# Patient Record
Sex: FEMALE | Race: WHITE | NOT HISPANIC OR LATINO | Employment: FULL TIME | URBAN - NONMETROPOLITAN AREA
[De-identification: names, ages, dates, MRNs, and addresses within clinical notes are randomized per-mention and may not be internally consistent; named-entity substitution may affect disease eponyms.]

---

## 2023-10-23 ENCOUNTER — OFFICE VISIT (OUTPATIENT)
Dept: MEDICAL GROUP | Facility: CLINIC | Age: 48
End: 2023-10-23
Payer: COMMERCIAL

## 2023-10-23 VITALS
RESPIRATION RATE: 20 BRPM | HEIGHT: 64 IN | DIASTOLIC BLOOD PRESSURE: 70 MMHG | HEART RATE: 104 BPM | WEIGHT: 209 LBS | OXYGEN SATURATION: 97 % | BODY MASS INDEX: 35.68 KG/M2 | SYSTOLIC BLOOD PRESSURE: 122 MMHG | TEMPERATURE: 99.1 F

## 2023-10-23 DIAGNOSIS — D50.8 OTHER IRON DEFICIENCY ANEMIA: ICD-10-CM

## 2023-10-23 DIAGNOSIS — R73.03 PREDIABETES: ICD-10-CM

## 2023-10-23 DIAGNOSIS — R10.13 DYSPEPSIA: ICD-10-CM

## 2023-10-23 DIAGNOSIS — J45.41 MODERATE PERSISTENT ASTHMA WITH ACUTE EXACERBATION IN ADULT: ICD-10-CM

## 2023-10-23 DIAGNOSIS — E66.09 CLASS 2 OBESITY DUE TO EXCESS CALORIES WITHOUT SERIOUS COMORBIDITY WITH BODY MASS INDEX (BMI) OF 35.0 TO 35.9 IN ADULT: ICD-10-CM

## 2023-10-23 DIAGNOSIS — C54.1 ENDOMETRIAL CANCER (HCC): ICD-10-CM

## 2023-10-23 DIAGNOSIS — Z83.2 FAMILY HISTORY OF CLOTTING DISORDER: ICD-10-CM

## 2023-10-23 DIAGNOSIS — H35.3290 EXUDATIVE AGE-RELATED MACULAR DEGENERATION, UNSPECIFIED LATERALITY, UNSPECIFIED STAGE (HCC): ICD-10-CM

## 2023-10-23 PROBLEM — E66.9 CLASS 2 OBESITY WITH BODY MASS INDEX (BMI) OF 35.0 TO 35.9 IN ADULT: Status: ACTIVE | Noted: 2023-10-23

## 2023-10-23 PROBLEM — E66.812 CLASS 2 OBESITY WITH BODY MASS INDEX (BMI) OF 35.0 TO 35.9 IN ADULT: Status: ACTIVE | Noted: 2023-10-23

## 2023-10-23 PROCEDURE — 3078F DIAST BP <80 MM HG: CPT | Performed by: PHYSICIAN ASSISTANT

## 2023-10-23 PROCEDURE — 3074F SYST BP LT 130 MM HG: CPT | Performed by: PHYSICIAN ASSISTANT

## 2023-10-23 PROCEDURE — 99204 OFFICE O/P NEW MOD 45 MIN: CPT | Performed by: PHYSICIAN ASSISTANT

## 2023-10-23 RX ORDER — CETIRIZINE HYDROCHLORIDE 10 MG/1
10 TABLET, CHEWABLE ORAL NIGHTLY
COMMUNITY
End: 2023-12-21

## 2023-10-23 RX ORDER — BUDESONIDE AND FORMOTEROL FUMARATE DIHYDRATE 80; 4.5 UG/1; UG/1
1 AEROSOL RESPIRATORY (INHALATION) 2 TIMES DAILY
Qty: 1 EACH | Refills: 5 | Status: SHIPPED | OUTPATIENT
Start: 2023-10-23

## 2023-10-23 RX ORDER — ALBUTEROL SULFATE 90 UG/1
2 AEROSOL, METERED RESPIRATORY (INHALATION) EVERY 4 HOURS PRN
Qty: 1 EACH | Refills: 2 | Status: SHIPPED | OUTPATIENT
Start: 2023-10-23

## 2023-10-23 RX ORDER — SENNOSIDES A AND B 8.6 MG/1
8.6 TABLET, FILM COATED ORAL DAILY
Qty: 30 TABLET | Refills: 0 | Status: SHIPPED | OUTPATIENT
Start: 2023-10-23

## 2023-10-23 ASSESSMENT — PATIENT HEALTH QUESTIONNAIRE - PHQ9: CLINICAL INTERPRETATION OF PHQ2 SCORE: 0

## 2023-10-23 NOTE — PROGRESS NOTES
"cc:  establish care    Subjective:     Carole Garcia is a 48 y.o. female presenting for establish care      Patient presents to the office for [facility] care.  Patient indicates that she has been diagnosed with endometrial cancer.  She also had lab work drawn on 9-.  It does show that she is anemic, that she has an A1c of 5.7, and that her triglycerides are elevated at 180.  Thyroid levels are normal.  Patient is also concerned about her thyroid.  She states that multiple family members have a history of hashimotos and states that she is concerned about her neck size.  Patient also indicates seeing a retina specialist for wet macular degeneration.      Patient states that she had an episode of shortness of breath.  She states that this occurred 2 years ago.  She is concerned that she had shortness of breath.  She states that at the time there was some stress going on.      Patient states that she is using an otc inhaler--primatine.  She states that she can use an inhaler several times a day.   She does report being on Advair in the past.      Patient states that she has been taking an \"antacid\" for several years.  Sugar and salt make it worse.    Review of systems:  See above.   Denies any symptoms unless previously indicated.        Current Outpatient Medications:     cetirizine (ZYRTEC) 10 MG chewable tablet, Chew 10 mg every evening., Disp: , Rfl:     budesonide-formoterol (SYMBICORT) 80-4.5 MCG/ACT Aerosol, Inhale 1 Puff 2 times a day., Disp: 1 Each, Rfl: 5    albuterol (PROVENTIL HFA) 108 (90 Base) MCG/ACT Aero Soln inhalation aerosol, Inhale 2 Puffs every four hours as needed for Shortness of Breath., Disp: 1 Each, Rfl: 2    sennosides (SENOKOT) 8.6 MG Tab, Take 1 Tablet by mouth every day., Disp: 30 Tablet, Rfl: 0    ibuprofen (MOTRIN) 800 MG TABS, Take 1 Tab by mouth every 8 hours as needed. AS NEEDED FOR MILD PAIN, Disp: 90 Tab, Rfl: 0    ferrous sulfate 325 (65 FE) MG tablet, Take 1 Tab by mouth " "every day., Disp: 30 Tab, Rfl: 3    Pseudoephedrine HCl (SUDAFED 12 HOUR PO), Take  by mouth., Disp: , Rfl:     ranitidine (ZANTAC) 150 MG TABS, Take 150 mg by mouth 2 times a day., Disp: , Rfl:     fenofibrate (TRICOR) 145 MG TABS, Take 1 Tab by mouth every day. (Patient not taking: Reported on 10/23/2023), Disp: 30 Tab, Rfl: 0    IRON, FERROUS GLUCONATE, PO, Take  by mouth. (Patient not taking: Reported on 10/23/2023), Disp: , Rfl:     Allergies, past medical history, past surgical history, family history, social history reviewed and updated    Objective:     Vitals: /70 (BP Location: Left arm, Patient Position: Sitting, BP Cuff Size: Adult)   Pulse (!) 104   Temp 37.3 °C (99.1 °F) (Temporal)   Resp 20   Ht 1.626 m (5' 4\")   Wt 94.8 kg (208 lb 15.9 oz)   SpO2 97%   BMI 35.87 kg/m²   General: Alert, pleasant, NAD  EYES:   PERRL, EOMI, no icterus or pallor.  Conjunctivae and lids normal.   HENT:  Normocephalic.  External ears normal. Neck supple.     Heart: Regular rate and rhythm.  S1 and S2 normal.  No murmurs appreciated.  Respiratory: Normal respiratory effort.  Clear to auscultation bilaterally.  Abdomen: obese  Skin: Warm, dry, no rashes.  Musculoskeletal: Gait is normal.  Moves all extremities well.    Extremities: Normal range of motion all extremities.   Neurological: No tremors, sensation grossly intact,  gait is normal, CN2-12 intact.  Psych:  Affect/mood is normal, judgement is good, memory is intact, grooming is appropriate.    Assessment/Plan:     Carole was seen today for establish care.    Diagnoses and all orders for this visit:    Endometrial cancer (HCC)    Patient has an appointment with hematology oncology.  She will also follow-up with gynecology.    Prediabetes  Class 2 obesity due to excess calories without serious comorbidity with body mass index (BMI) of 35.0 to 35.9 in adult  Other iron deficiency anemia  -     CBC WITH DIFFERENTIAL; Future  -     IRON/TOTAL IRON BIND; " Future  -     VITAMIN B12; Future  -     FOLATE; Future  -     FERRITIN; Future  -     sennosides (SENOKOT) 8.6 MG Tab; Take 1 Tablet by mouth every day.    We will obtain some further labs including iron levels to evaluate further.  We can recheck A1c in approximately 6 months.  Follow-up in 5 weeks.    Exudative age-related macular degeneration, unspecified laterality, unspecified stage (HCC)      Continue to follow-up with ophthalmology.    Moderate persistent asthma with acute exacerbation in adult  -     budesonide-formoterol (SYMBICORT) 80-4.5 MCG/ACT Aerosol; Inhale 1 Puff 2 times a day.  -     albuterol (PROVENTIL HFA) 108 (90 Base) MCG/ACT Aero Soln inhalation aerosol; Inhale 2 Puffs every four hours as needed for Shortness of Breath.    We will start patient on Symbicort 80/4.51 puff twice a day and refill her albuterol.  Goal is that we will help patient decrease her albuterol use and have asthma better controlled.    Dyspepsia  -     H. PYLORI BREATH TEST  Family history of clotting disorder  -     FACTOR V LEIDEN MUTATION; Future    Further testing ordered to evaluate further.  For dyspepsia, will hold off on amlodipine until we can evaluate H. pylori.  Stop famotidine a week before breath test.        Return in about 4 weeks (around 11/20/2023).    Please note that this dictation was created using voice recognition software. I have made every reasonable attempt to correct obvious errors, but expect that there are errors of grammar and possible content that I did not discover before finalizing note.

## 2023-11-28 LAB — UREA BREATH TEST QL: NEGATIVE

## 2023-11-29 ENCOUNTER — OFFICE VISIT (OUTPATIENT)
Dept: MEDICAL GROUP | Facility: CLINIC | Age: 48
End: 2023-11-29
Payer: COMMERCIAL

## 2023-11-29 VITALS
HEART RATE: 104 BPM | HEIGHT: 64 IN | DIASTOLIC BLOOD PRESSURE: 64 MMHG | WEIGHT: 208.6 LBS | RESPIRATION RATE: 20 BRPM | SYSTOLIC BLOOD PRESSURE: 118 MMHG | BODY MASS INDEX: 35.61 KG/M2 | OXYGEN SATURATION: 100 % | TEMPERATURE: 98.8 F

## 2023-11-29 DIAGNOSIS — E78.5 DYSLIPIDEMIA: ICD-10-CM

## 2023-11-29 DIAGNOSIS — Z13.21 ENCOUNTER FOR VITAMIN DEFICIENCY SCREENING: ICD-10-CM

## 2023-11-29 DIAGNOSIS — D50.8 OTHER IRON DEFICIENCY ANEMIA: ICD-10-CM

## 2023-11-29 DIAGNOSIS — R73.03 PREDIABETES: ICD-10-CM

## 2023-11-29 DIAGNOSIS — R20.0 BILATERAL HAND NUMBNESS: ICD-10-CM

## 2023-11-29 DIAGNOSIS — R10.13 DYSPEPSIA: ICD-10-CM

## 2023-11-29 DIAGNOSIS — E55.9 VITAMIN D DEFICIENCY: ICD-10-CM

## 2023-11-29 PROCEDURE — 99214 OFFICE O/P EST MOD 30 MIN: CPT | Performed by: PHYSICIAN ASSISTANT

## 2023-11-29 PROCEDURE — 3078F DIAST BP <80 MM HG: CPT | Performed by: PHYSICIAN ASSISTANT

## 2023-11-29 PROCEDURE — 3074F SYST BP LT 130 MM HG: CPT | Performed by: PHYSICIAN ASSISTANT

## 2023-11-29 RX ORDER — OMEPRAZOLE 40 MG/1
40 CAPSULE, DELAYED RELEASE ORAL DAILY
Qty: 90 CAPSULE | Refills: 1 | Status: SHIPPED | OUTPATIENT
Start: 2023-11-29

## 2023-11-29 NOTE — PROGRESS NOTES
cc: Follow-up    Subjective:     Carole Garcia is a 48 y.o. female presenting for follow-up    Patient presents to the office for follow-up.  Patient presents to the office to follow-up with anemia and prediabetes along with dyspepsia and a family history of clotting disorder.  At this time we do not have her most recent lab results.Patient states that she had labs drawn 2 days ago.  She states that she is doing well with the inhaler.      Patient states that she was diagnosed with carpal tunnel in 2014.  She states that she has been using braces and doing exercises.  She has continued with splints.   She would like to wait on the nerve conduction study.      Patient states that her acid reflux symptoms are improved with medication.  However, she is still having symptoms.  She stopped taking the ranitidine to take the breath test.      Patient states that she has been having achy legs.  She would like to start a vitamin D supplement.  She does have vitamin D deficiency.    Review of systems:  See above.   Denies any symptoms unless previously indicated.        Current Outpatient Medications:     omeprazole (PRILOSEC) 40 MG delayed-release capsule, Take 1 Capsule by mouth every day., Disp: 90 Capsule, Rfl: 1    cetirizine (ZYRTEC) 10 MG chewable tablet, Chew 10 mg every evening., Disp: , Rfl:     budesonide-formoterol (SYMBICORT) 80-4.5 MCG/ACT Aerosol, Inhale 1 Puff 2 times a day., Disp: 1 Each, Rfl: 5    albuterol (PROVENTIL HFA) 108 (90 Base) MCG/ACT Aero Soln inhalation aerosol, Inhale 2 Puffs every four hours as needed for Shortness of Breath., Disp: 1 Each, Rfl: 2    sennosides (SENOKOT) 8.6 MG Tab, Take 1 Tablet by mouth every day., Disp: 30 Tablet, Rfl: 0    ibuprofen (MOTRIN) 800 MG TABS, Take 1 Tab by mouth every 8 hours as needed. AS NEEDED FOR MILD PAIN, Disp: 90 Tab, Rfl: 0    Pseudoephedrine HCl (SUDAFED 12 HOUR PO), Take  by mouth., Disp: , Rfl:     fenofibrate (TRICOR) 145 MG TABS, Take 1 Tab by mouth  "every day. (Patient not taking: Reported on 10/23/2023), Disp: 30 Tab, Rfl: 0    ferrous sulfate 325 (65 FE) MG tablet, Take 1 Tab by mouth every day. (Patient not taking: Reported on 11/29/2023), Disp: 30 Tab, Rfl: 3    ranitidine (ZANTAC) 150 MG TABS, Take 150 mg by mouth 2 times a day. (Patient not taking: Reported on 11/29/2023), Disp: , Rfl:     Allergies, past medical history, past surgical history, family history, social history reviewed and updated    Objective:     Vitals: /64 (BP Location: Left arm, Patient Position: Sitting, BP Cuff Size: Adult)   Pulse (!) 104   Temp 37.1 °C (98.8 °F) (Temporal)   Resp 20   Ht 1.613 m (5' 3.5\")   Wt 94.6 kg (208 lb 9.6 oz)   SpO2 100%   BMI 36.37 kg/m²   General: Alert, pleasant, NAD  EYES:   PERRL, EOMI, no icterus or pallor.  Conjunctivae and lids normal.   HENT:  Normocephalic.  External ears normal.  Neck supple.    Respiratory: Normal respiratory effort.    Abdomen: obese  Skin: Warm, dry, no rashes.  Musculoskeletal: Gait is normal.  Moves all extremities well.    Extremities: Normal range of motion all extremities.   Neurological: No tremors, sensation grossly intact, CN2-12 intact.  Psych:  Affect/mood is normal, judgement is good, memory is intact, grooming is appropriate.    Assessment/Plan:     Carole was seen today for lab results and wrist pain.    Diagnoses and all orders for this visit:    Prediabetes  -     Lipid Profile; Future  -     Comp Metabolic Panel; Future  -     HEMOGLOBIN A1C; Future  Other iron deficiency anemia  -     CBC WITH DIFFERENTIAL; Future  -     IRON/TOTAL IRON BIND; Future  -     FERRITIN; Future  Dyslipidemia  -     Lipid Profile; Future  -     Comp Metabolic Panel; Future    Repeat labs in approximately 5 months.  Follow-up with test results when received.    Bilateral hand numbness    Patient is not wanting to proceed with nerve conduction study at this time but she does want to note that her symptoms are starting to " worsen.  Advised patient that she can contact our office and we can place the order further nerve conduction study when she is ready.  At this time she will continue with wrist splints and exercises.    Dyspepsia  -     omeprazole (PRILOSEC) 40 MG delayed-release capsule; Take 1 Capsule by mouth every day.    Willing to try omeprazole.  Side effects of medication discussed.  Will let us know any changes in symptoms.    Encounter for vitamin deficiency screening  -     VITAMIN D,25 HYDROXY (DEFICIENCY); Future  Vitamin D deficiency  -     VITAMIN D,25 HYDROXY (DEFICIENCY); Future    Labs ordered to evaluate further.  In the meantime patient can take between 2000 to 5000 units daily.        No follow-ups on file.    Please note that this dictation was created using voice recognition software. I have made every reasonable attempt to correct obvious errors, but expect that there are errors of grammar and possible content that I did not discover before finalizing note.

## 2023-11-30 LAB
BASOPHILS # BLD AUTO: 0.1 X10E3/UL (ref 0–0.2)
BASOPHILS NFR BLD AUTO: 1 %
EOSINOPHIL # BLD AUTO: 0.1 X10E3/UL (ref 0–0.4)
EOSINOPHIL NFR BLD AUTO: 2 %
ERYTHROCYTE [DISTWIDTH] IN BLOOD BY AUTOMATED COUNT: 23.7 % (ref 11.7–15.4)
F5 P.R506Q BLD/T QL: NORMAL
FERRITIN SERPL-MCNC: 283 NG/ML (ref 15–150)
FOLATE SERPL-MCNC: 6.1 NG/ML
HCT VFR BLD AUTO: 38.6 % (ref 34–46.6)
HGB BLD-MCNC: 12.1 G/DL (ref 11.1–15.9)
IMM GRANULOCYTES # BLD AUTO: 0 X10E3/UL (ref 0–0.1)
IMM GRANULOCYTES NFR BLD AUTO: 0 %
IMMATURE CELLS  115398: ABNORMAL
IRON SATN MFR SERPL: 19 % (ref 15–55)
IRON SERPL-MCNC: 54 UG/DL (ref 27–159)
LYMPHOCYTES # BLD AUTO: 1.5 X10E3/UL (ref 0.7–3.1)
LYMPHOCYTES NFR BLD AUTO: 20 %
MCH RBC QN AUTO: 27 PG (ref 26.6–33)
MCHC RBC AUTO-ENTMCNC: 31.3 G/DL (ref 31.5–35.7)
MCV RBC AUTO: 86 FL (ref 79–97)
MONOCYTES # BLD AUTO: 0.7 X10E3/UL (ref 0.1–0.9)
MONOCYTES NFR BLD AUTO: 9 %
MORPHOLOGY BLD-IMP: ABNORMAL
NEUTROPHILS # BLD AUTO: 4.9 X10E3/UL (ref 1.4–7)
NEUTROPHILS NFR BLD AUTO: 68 %
NRBC BLD AUTO-RTO: ABNORMAL %
PLATELET # BLD AUTO: 236 X10E3/UL (ref 150–450)
RBC # BLD AUTO: 4.48 X10E6/UL (ref 3.77–5.28)
TIBC SERPL-MCNC: 288 UG/DL (ref 250–450)
UIBC SERPL-MCNC: 234 UG/DL (ref 131–425)
VIT B12 SERPL-MCNC: 732 PG/ML (ref 232–1245)
WBC # BLD AUTO: 7.3 X10E3/UL (ref 3.4–10.8)

## 2023-12-15 ENCOUNTER — APPOINTMENT (OUTPATIENT)
Dept: ADMISSIONS | Facility: MEDICAL CENTER | Age: 48
End: 2023-12-15
Attending: SPECIALIST
Payer: COMMERCIAL

## 2023-12-21 ENCOUNTER — PRE-ADMISSION TESTING (OUTPATIENT)
Dept: ADMISSIONS | Facility: MEDICAL CENTER | Age: 48
End: 2023-12-21
Attending: SPECIALIST
Payer: COMMERCIAL

## 2023-12-21 RX ORDER — VITAMIN B COMPLEX
2000 TABLET ORAL DAILY
COMMUNITY

## 2023-12-26 ENCOUNTER — PRE-ADMISSION TESTING (OUTPATIENT)
Dept: ADMISSIONS | Facility: MEDICAL CENTER | Age: 48
End: 2023-12-26
Attending: SPECIALIST
Payer: COMMERCIAL

## 2023-12-26 ENCOUNTER — HOSPITAL ENCOUNTER (OUTPATIENT)
Dept: RADIOLOGY | Facility: MEDICAL CENTER | Age: 48
End: 2023-12-26
Attending: SPECIALIST | Admitting: SPECIALIST
Payer: COMMERCIAL

## 2023-12-26 DIAGNOSIS — Z01.811 PRE-OPERATIVE RESPIRATORY EXAMINATION: ICD-10-CM

## 2023-12-26 DIAGNOSIS — Z01.812 PRE-OPERATIVE LABORATORY EXAMINATION: ICD-10-CM

## 2023-12-26 DIAGNOSIS — Z01.810 PRE-OPERATIVE CARDIOVASCULAR EXAMINATION: ICD-10-CM

## 2023-12-26 LAB
ABO GROUP BLD: NORMAL
ALBUMIN SERPL BCP-MCNC: 4.3 G/DL (ref 3.2–4.9)
ALBUMIN/GLOB SERPL: 1.7 G/DL
ALP SERPL-CCNC: 81 U/L (ref 30–99)
ALT SERPL-CCNC: 31 U/L (ref 2–50)
ANION GAP SERPL CALC-SCNC: 11 MMOL/L (ref 7–16)
APTT PPP: 31.3 SEC (ref 24.7–36)
AST SERPL-CCNC: 19 U/L (ref 12–45)
B-HCG SERPL-ACNC: <1 MIU/ML (ref 0–5)
BASOPHILS # BLD AUTO: 1 % (ref 0–1.8)
BASOPHILS # BLD: 0.08 K/UL (ref 0–0.12)
BILIRUB SERPL-MCNC: 0.4 MG/DL (ref 0.1–1.5)
BLD GP AB SCN SERPL QL: NORMAL
BUN SERPL-MCNC: 7 MG/DL (ref 8–22)
CALCIUM ALBUM COR SERPL-MCNC: 8.8 MG/DL (ref 8.5–10.5)
CALCIUM SERPL-MCNC: 9 MG/DL (ref 8.5–10.5)
CHLORIDE SERPL-SCNC: 103 MMOL/L (ref 96–112)
CO2 SERPL-SCNC: 22 MMOL/L (ref 20–33)
COMMENT 1642: NORMAL
CREAT SERPL-MCNC: 0.76 MG/DL (ref 0.5–1.4)
EKG IMPRESSION: NORMAL
EOSINOPHIL # BLD AUTO: 0.17 K/UL (ref 0–0.51)
EOSINOPHIL NFR BLD: 2.1 % (ref 0–6.9)
ERYTHROCYTE [DISTWIDTH] IN BLOOD BY AUTOMATED COUNT: 65.2 FL (ref 35.9–50)
GFR SERPLBLD CREATININE-BSD FMLA CKD-EPI: 98 ML/MIN/1.73 M 2
GLOBULIN SER CALC-MCNC: 2.5 G/DL (ref 1.9–3.5)
GLUCOSE SERPL-MCNC: 99 MG/DL (ref 65–99)
HCT VFR BLD AUTO: 40.9 % (ref 37–47)
HGB BLD-MCNC: 13.2 G/DL (ref 12–16)
IMM GRANULOCYTES # BLD AUTO: 0.02 K/UL (ref 0–0.11)
IMM GRANULOCYTES NFR BLD AUTO: 0.2 % (ref 0–0.9)
INR PPP: 1.07 (ref 0.87–1.13)
LYMPHOCYTES # BLD AUTO: 1.72 K/UL (ref 1–4.8)
LYMPHOCYTES NFR BLD: 21.1 % (ref 22–41)
MCH RBC QN AUTO: 28.6 PG (ref 27–33)
MCHC RBC AUTO-ENTMCNC: 32.3 G/DL (ref 32.2–35.5)
MCV RBC AUTO: 88.5 FL (ref 81.4–97.8)
MONOCYTES # BLD AUTO: 0.88 K/UL (ref 0–0.85)
MONOCYTES NFR BLD AUTO: 10.8 % (ref 0–13.4)
MORPHOLOGY BLD-IMP: NORMAL
NEUTROPHILS # BLD AUTO: 5.27 K/UL (ref 1.82–7.42)
NEUTROPHILS NFR BLD: 64.8 % (ref 44–72)
NRBC # BLD AUTO: 0 K/UL
NRBC BLD-RTO: 0 /100 WBC (ref 0–0.2)
OVALOCYTES BLD QL SMEAR: NORMAL
PLATELET # BLD AUTO: 275 K/UL (ref 164–446)
PLATELET BLD QL SMEAR: NORMAL
PMV BLD AUTO: 10.6 FL (ref 9–12.9)
POIKILOCYTOSIS BLD QL SMEAR: NORMAL
POTASSIUM SERPL-SCNC: 4 MMOL/L (ref 3.6–5.5)
PROT SERPL-MCNC: 6.8 G/DL (ref 6–8.2)
PROTHROMBIN TIME: 14 SEC (ref 12–14.6)
RBC # BLD AUTO: 4.62 M/UL (ref 4.2–5.4)
RBC BLD AUTO: PRESENT
RH BLD: NORMAL
SODIUM SERPL-SCNC: 136 MMOL/L (ref 135–145)
WBC # BLD AUTO: 8.1 K/UL (ref 4.8–10.8)

## 2023-12-26 PROCEDURE — 36415 COLL VENOUS BLD VENIPUNCTURE: CPT

## 2023-12-26 PROCEDURE — 71045 X-RAY EXAM CHEST 1 VIEW: CPT

## 2023-12-26 PROCEDURE — 93005 ELECTROCARDIOGRAM TRACING: CPT

## 2023-12-26 PROCEDURE — 85730 THROMBOPLASTIN TIME PARTIAL: CPT

## 2023-12-26 PROCEDURE — 86850 RBC ANTIBODY SCREEN: CPT

## 2023-12-26 PROCEDURE — 85610 PROTHROMBIN TIME: CPT

## 2023-12-26 PROCEDURE — 84702 CHORIONIC GONADOTROPIN TEST: CPT

## 2023-12-26 PROCEDURE — 93010 ELECTROCARDIOGRAM REPORT: CPT | Performed by: INTERNAL MEDICINE

## 2023-12-26 PROCEDURE — 86901 BLOOD TYPING SEROLOGIC RH(D): CPT

## 2023-12-26 PROCEDURE — 85025 COMPLETE CBC W/AUTO DIFF WBC: CPT

## 2023-12-26 PROCEDURE — 80053 COMPREHEN METABOLIC PANEL: CPT

## 2023-12-26 PROCEDURE — 86900 BLOOD TYPING SEROLOGIC ABO: CPT

## 2023-12-28 ENCOUNTER — HOSPITAL ENCOUNTER (OUTPATIENT)
Facility: MEDICAL CENTER | Age: 48
End: 2023-12-28
Attending: SPECIALIST | Admitting: SPECIALIST
Payer: COMMERCIAL

## 2023-12-28 ENCOUNTER — ANESTHESIA EVENT (OUTPATIENT)
Dept: SURGERY | Facility: MEDICAL CENTER | Age: 48
End: 2023-12-28
Payer: COMMERCIAL

## 2023-12-28 ENCOUNTER — ANESTHESIA (OUTPATIENT)
Dept: SURGERY | Facility: MEDICAL CENTER | Age: 48
End: 2023-12-28
Payer: COMMERCIAL

## 2023-12-28 VITALS
HEART RATE: 89 BPM | BODY MASS INDEX: 37.46 KG/M2 | SYSTOLIC BLOOD PRESSURE: 125 MMHG | TEMPERATURE: 98.4 F | DIASTOLIC BLOOD PRESSURE: 79 MMHG | RESPIRATION RATE: 16 BRPM | WEIGHT: 211.42 LBS | HEIGHT: 63 IN | OXYGEN SATURATION: 93 %

## 2023-12-28 LAB — ABO + RH BLD: NORMAL

## 2023-12-28 PROCEDURE — 700101 HCHG RX REV CODE 250: Performed by: ANESTHESIOLOGY

## 2023-12-28 PROCEDURE — 160025 RECOVERY II MINUTES (STATS): Performed by: SPECIALIST

## 2023-12-28 PROCEDURE — 160002 HCHG RECOVERY MINUTES (STAT): Performed by: SPECIALIST

## 2023-12-28 PROCEDURE — 88112 CYTOPATH CELL ENHANCE TECH: CPT

## 2023-12-28 PROCEDURE — 700101 HCHG RX REV CODE 250: Performed by: SPECIALIST

## 2023-12-28 PROCEDURE — 160042 HCHG SURGERY MINUTES - EA ADDL 1 MIN LEVEL 5: Performed by: SPECIALIST

## 2023-12-28 PROCEDURE — 700105 HCHG RX REV CODE 258: Performed by: SPECIALIST

## 2023-12-28 PROCEDURE — 700102 HCHG RX REV CODE 250 W/ 637 OVERRIDE(OP): Performed by: ANESTHESIOLOGY

## 2023-12-28 PROCEDURE — 88342 IMHCHEM/IMCYTCHM 1ST ANTB: CPT

## 2023-12-28 PROCEDURE — 36415 COLL VENOUS BLD VENIPUNCTURE: CPT

## 2023-12-28 PROCEDURE — 160009 HCHG ANES TIME/MIN: Performed by: SPECIALIST

## 2023-12-28 PROCEDURE — 160048 HCHG OR STATISTICAL LEVEL 1-5: Performed by: SPECIALIST

## 2023-12-28 PROCEDURE — 88309 TISSUE EXAM BY PATHOLOGIST: CPT

## 2023-12-28 PROCEDURE — 502714 HCHG ROBOTIC SURGERY SERVICES: Performed by: SPECIALIST

## 2023-12-28 PROCEDURE — 88360 TUMOR IMMUNOHISTOCHEM/MANUAL: CPT

## 2023-12-28 PROCEDURE — 160035 HCHG PACU - 1ST 60 MINS PHASE I: Performed by: SPECIALIST

## 2023-12-28 PROCEDURE — 700111 HCHG RX REV CODE 636 W/ 250 OVERRIDE (IP): Performed by: ANESTHESIOLOGY

## 2023-12-28 PROCEDURE — 160036 HCHG PACU - EA ADDL 30 MINS PHASE I: Performed by: SPECIALIST

## 2023-12-28 PROCEDURE — 700111 HCHG RX REV CODE 636 W/ 250 OVERRIDE (IP): Mod: JZ | Performed by: ANESTHESIOLOGY

## 2023-12-28 PROCEDURE — 88307 TISSUE EXAM BY PATHOLOGIST: CPT

## 2023-12-28 PROCEDURE — 88305 TISSUE EXAM BY PATHOLOGIST: CPT

## 2023-12-28 PROCEDURE — 88341 IMHCHEM/IMCYTCHM EA ADD ANTB: CPT | Mod: 91

## 2023-12-28 PROCEDURE — 160031 HCHG SURGERY MINUTES - 1ST 30 MINS LEVEL 5: Performed by: SPECIALIST

## 2023-12-28 PROCEDURE — 160046 HCHG PACU - 1ST 60 MINS PHASE II: Performed by: SPECIALIST

## 2023-12-28 PROCEDURE — A9270 NON-COVERED ITEM OR SERVICE: HCPCS | Performed by: ANESTHESIOLOGY

## 2023-12-28 PROCEDURE — 700104 HCHG RX REV CODE 254: Performed by: SPECIALIST

## 2023-12-28 RX ORDER — OXYCODONE HCL 5 MG/5 ML
5 SOLUTION, ORAL ORAL
Status: COMPLETED | OUTPATIENT
Start: 2023-12-28 | End: 2023-12-28

## 2023-12-28 RX ORDER — HYDROMORPHONE HYDROCHLORIDE 1 MG/ML
0.4 INJECTION, SOLUTION INTRAMUSCULAR; INTRAVENOUS; SUBCUTANEOUS
Status: DISCONTINUED | OUTPATIENT
Start: 2023-12-28 | End: 2023-12-28 | Stop reason: HOSPADM

## 2023-12-28 RX ORDER — ONDANSETRON 2 MG/ML
INJECTION INTRAMUSCULAR; INTRAVENOUS PRN
Status: DISCONTINUED | OUTPATIENT
Start: 2023-12-28 | End: 2023-12-28 | Stop reason: SURG

## 2023-12-28 RX ORDER — IBUPROFEN 200 MG
400 TABLET ORAL 2 TIMES DAILY PRN
COMMUNITY

## 2023-12-28 RX ORDER — EPHEDRINE SULFATE 50 MG/ML
5 INJECTION, SOLUTION INTRAVENOUS
Status: DISCONTINUED | OUTPATIENT
Start: 2023-12-28 | End: 2023-12-28 | Stop reason: HOSPADM

## 2023-12-28 RX ORDER — GABAPENTIN 300 MG/1
300 CAPSULE ORAL ONCE
Status: COMPLETED | OUTPATIENT
Start: 2023-12-28 | End: 2023-12-28

## 2023-12-28 RX ORDER — MIDAZOLAM HYDROCHLORIDE 1 MG/ML
1 INJECTION INTRAMUSCULAR; INTRAVENOUS
Status: DISCONTINUED | OUTPATIENT
Start: 2023-12-28 | End: 2023-12-28 | Stop reason: HOSPADM

## 2023-12-28 RX ORDER — BUPIVACAINE HYDROCHLORIDE AND EPINEPHRINE 2.5; 5 MG/ML; UG/ML
INJECTION, SOLUTION EPIDURAL; INFILTRATION; INTRACAUDAL; PERINEURAL
Status: DISCONTINUED | OUTPATIENT
Start: 2023-12-28 | End: 2023-12-28 | Stop reason: HOSPADM

## 2023-12-28 RX ORDER — METOPROLOL TARTRATE 1 MG/ML
1 INJECTION, SOLUTION INTRAVENOUS
Status: DISCONTINUED | OUTPATIENT
Start: 2023-12-28 | End: 2023-12-28 | Stop reason: HOSPADM

## 2023-12-28 RX ORDER — ACETAMINOPHEN 500 MG
1000 TABLET ORAL
COMMUNITY

## 2023-12-28 RX ORDER — HYDROMORPHONE HYDROCHLORIDE 2 MG/ML
INJECTION, SOLUTION INTRAMUSCULAR; INTRAVENOUS; SUBCUTANEOUS PRN
Status: DISCONTINUED | OUTPATIENT
Start: 2023-12-28 | End: 2023-12-28 | Stop reason: SURG

## 2023-12-28 RX ORDER — HYDRALAZINE HYDROCHLORIDE 20 MG/ML
5 INJECTION INTRAMUSCULAR; INTRAVENOUS
Status: DISCONTINUED | OUTPATIENT
Start: 2023-12-28 | End: 2023-12-28 | Stop reason: HOSPADM

## 2023-12-28 RX ORDER — CELECOXIB 200 MG/1
400 CAPSULE ORAL ONCE
Status: COMPLETED | OUTPATIENT
Start: 2023-12-28 | End: 2023-12-28

## 2023-12-28 RX ORDER — SODIUM CHLORIDE, SODIUM LACTATE, POTASSIUM CHLORIDE, CALCIUM CHLORIDE 600; 310; 30; 20 MG/100ML; MG/100ML; MG/100ML; MG/100ML
INJECTION, SOLUTION INTRAVENOUS
Status: DISCONTINUED | OUTPATIENT
Start: 2023-12-28 | End: 2023-12-28 | Stop reason: HOSPADM

## 2023-12-28 RX ORDER — SCOLOPAMINE TRANSDERMAL SYSTEM 1 MG/1
1 PATCH, EXTENDED RELEASE TRANSDERMAL
Status: DISCONTINUED | OUTPATIENT
Start: 2023-12-28 | End: 2023-12-28 | Stop reason: HOSPADM

## 2023-12-28 RX ORDER — MEPERIDINE HYDROCHLORIDE 25 MG/ML
12.5 INJECTION INTRAMUSCULAR; INTRAVENOUS; SUBCUTANEOUS
Status: DISCONTINUED | OUTPATIENT
Start: 2023-12-28 | End: 2023-12-28 | Stop reason: HOSPADM

## 2023-12-28 RX ORDER — SODIUM CHLORIDE, SODIUM LACTATE, POTASSIUM CHLORIDE, CALCIUM CHLORIDE 600; 310; 30; 20 MG/100ML; MG/100ML; MG/100ML; MG/100ML
INJECTION, SOLUTION INTRAVENOUS CONTINUOUS
Status: ACTIVE | OUTPATIENT
Start: 2023-12-28 | End: 2023-12-28

## 2023-12-28 RX ORDER — ROCURONIUM BROMIDE 10 MG/ML
INJECTION, SOLUTION INTRAVENOUS PRN
Status: DISCONTINUED | OUTPATIENT
Start: 2023-12-28 | End: 2023-12-28 | Stop reason: SURG

## 2023-12-28 RX ORDER — ACETAMINOPHEN 500 MG
1000 TABLET ORAL ONCE
Status: COMPLETED | OUTPATIENT
Start: 2023-12-28 | End: 2023-12-28

## 2023-12-28 RX ORDER — HYDROMORPHONE HYDROCHLORIDE 1 MG/ML
0.1 INJECTION, SOLUTION INTRAMUSCULAR; INTRAVENOUS; SUBCUTANEOUS
Status: DISCONTINUED | OUTPATIENT
Start: 2023-12-28 | End: 2023-12-28 | Stop reason: HOSPADM

## 2023-12-28 RX ORDER — ONDANSETRON 2 MG/ML
4 INJECTION INTRAMUSCULAR; INTRAVENOUS
Status: COMPLETED | OUTPATIENT
Start: 2023-12-28 | End: 2023-12-28

## 2023-12-28 RX ORDER — CEFOTETAN DISODIUM 2 G/20ML
INJECTION, POWDER, FOR SOLUTION INTRAMUSCULAR; INTRAVENOUS PRN
Status: DISCONTINUED | OUTPATIENT
Start: 2023-12-28 | End: 2023-12-28 | Stop reason: SURG

## 2023-12-28 RX ORDER — INDOCYANINE GREEN AND WATER 25 MG
KIT INJECTION
Status: DISCONTINUED | OUTPATIENT
Start: 2023-12-28 | End: 2023-12-28 | Stop reason: HOSPADM

## 2023-12-28 RX ORDER — HYDROMORPHONE HYDROCHLORIDE 1 MG/ML
0.2 INJECTION, SOLUTION INTRAMUSCULAR; INTRAVENOUS; SUBCUTANEOUS
Status: DISCONTINUED | OUTPATIENT
Start: 2023-12-28 | End: 2023-12-28 | Stop reason: HOSPADM

## 2023-12-28 RX ORDER — SODIUM CHLORIDE, SODIUM LACTATE, POTASSIUM CHLORIDE, CALCIUM CHLORIDE 600; 310; 30; 20 MG/100ML; MG/100ML; MG/100ML; MG/100ML
INJECTION, SOLUTION INTRAVENOUS CONTINUOUS
Status: DISCONTINUED | OUTPATIENT
Start: 2023-12-28 | End: 2023-12-28 | Stop reason: HOSPADM

## 2023-12-28 RX ORDER — DEXAMETHASONE SODIUM PHOSPHATE 4 MG/ML
INJECTION, SOLUTION INTRA-ARTICULAR; INTRALESIONAL; INTRAMUSCULAR; INTRAVENOUS; SOFT TISSUE PRN
Status: DISCONTINUED | OUTPATIENT
Start: 2023-12-28 | End: 2023-12-28 | Stop reason: SURG

## 2023-12-28 RX ORDER — OXYCODONE HCL 5 MG/5 ML
10 SOLUTION, ORAL ORAL
Status: COMPLETED | OUTPATIENT
Start: 2023-12-28 | End: 2023-12-28

## 2023-12-28 RX ORDER — DIPHENHYDRAMINE HYDROCHLORIDE 50 MG/ML
12.5 INJECTION INTRAMUSCULAR; INTRAVENOUS
Status: DISCONTINUED | OUTPATIENT
Start: 2023-12-28 | End: 2023-12-28 | Stop reason: HOSPADM

## 2023-12-28 RX ORDER — LABETALOL HYDROCHLORIDE 5 MG/ML
5 INJECTION, SOLUTION INTRAVENOUS
Status: DISCONTINUED | OUTPATIENT
Start: 2023-12-28 | End: 2023-12-28 | Stop reason: HOSPADM

## 2023-12-28 RX ORDER — MIDAZOLAM HYDROCHLORIDE 1 MG/ML
INJECTION INTRAMUSCULAR; INTRAVENOUS PRN
Status: DISCONTINUED | OUTPATIENT
Start: 2023-12-28 | End: 2023-12-28 | Stop reason: SURG

## 2023-12-28 RX ORDER — LIDOCAINE HYDROCHLORIDE 20 MG/ML
INJECTION, SOLUTION EPIDURAL; INFILTRATION; INTRACAUDAL; PERINEURAL PRN
Status: DISCONTINUED | OUTPATIENT
Start: 2023-12-28 | End: 2023-12-28 | Stop reason: SURG

## 2023-12-28 RX ORDER — HALOPERIDOL 5 MG/ML
1 INJECTION INTRAMUSCULAR
Status: DISCONTINUED | OUTPATIENT
Start: 2023-12-28 | End: 2023-12-28 | Stop reason: HOSPADM

## 2023-12-28 RX ORDER — OXYCODONE HCL 10 MG/1
10 TABLET, FILM COATED, EXTENDED RELEASE ORAL ONCE
Status: COMPLETED | OUTPATIENT
Start: 2023-12-28 | End: 2023-12-28

## 2023-12-28 RX ADMIN — OXYCODONE HYDROCHLORIDE 10 MG: 10 TABLET, FILM COATED, EXTENDED RELEASE ORAL at 12:06

## 2023-12-28 RX ADMIN — CELECOXIB 400 MG: 200 CAPSULE ORAL at 12:04

## 2023-12-28 RX ADMIN — FENTANYL CITRATE 100 MCG: 50 INJECTION, SOLUTION INTRAMUSCULAR; INTRAVENOUS at 15:52

## 2023-12-28 RX ADMIN — ROCURONIUM BROMIDE 20 MG: 50 INJECTION, SOLUTION INTRAVENOUS at 15:05

## 2023-12-28 RX ADMIN — HYDROMORPHONE HYDROCHLORIDE 0.5 MG: 2 INJECTION INTRAMUSCULAR; INTRAVENOUS; SUBCUTANEOUS at 16:11

## 2023-12-28 RX ADMIN — HALOPERIDOL LACTATE 1 MG: 5 INJECTION, SOLUTION INTRAMUSCULAR at 17:37

## 2023-12-28 RX ADMIN — MIDAZOLAM HYDROCHLORIDE 2 MG: 1 INJECTION, SOLUTION INTRAMUSCULAR; INTRAVENOUS at 14:19

## 2023-12-28 RX ADMIN — FENTANYL CITRATE 100 MCG: 50 INJECTION, SOLUTION INTRAMUSCULAR; INTRAVENOUS at 14:27

## 2023-12-28 RX ADMIN — SODIUM CHLORIDE, POTASSIUM CHLORIDE, SODIUM LACTATE AND CALCIUM CHLORIDE: 600; 310; 30; 20 INJECTION, SOLUTION INTRAVENOUS at 15:41

## 2023-12-28 RX ADMIN — PROPOFOL 200 MG: 10 INJECTION, EMULSION INTRAVENOUS at 14:27

## 2023-12-28 RX ADMIN — SCOPOLAMINE 1 PATCH: 1.5 PATCH, EXTENDED RELEASE TRANSDERMAL at 12:04

## 2023-12-28 RX ADMIN — FENTANYL CITRATE 50 MCG: 50 INJECTION, SOLUTION INTRAMUSCULAR; INTRAVENOUS at 15:14

## 2023-12-28 RX ADMIN — SUGAMMADEX 200 MG: 100 INJECTION, SOLUTION INTRAVENOUS at 15:59

## 2023-12-28 RX ADMIN — SODIUM CHLORIDE, POTASSIUM CHLORIDE, SODIUM LACTATE AND CALCIUM CHLORIDE: 600; 310; 30; 20 INJECTION, SOLUTION INTRAVENOUS at 10:33

## 2023-12-28 RX ADMIN — DEXAMETHASONE SODIUM PHOSPHATE 8 MG: 4 INJECTION INTRA-ARTICULAR; INTRALESIONAL; INTRAMUSCULAR; INTRAVENOUS; SOFT TISSUE at 14:27

## 2023-12-28 RX ADMIN — ROCURONIUM BROMIDE 60 MG: 50 INJECTION, SOLUTION INTRAVENOUS at 14:27

## 2023-12-28 RX ADMIN — ACETAMINOPHEN 1000 MG: 500 TABLET ORAL at 12:04

## 2023-12-28 RX ADMIN — ONDANSETRON 4 MG: 2 INJECTION INTRAMUSCULAR; INTRAVENOUS at 16:31

## 2023-12-28 RX ADMIN — OXYCODONE HYDROCHLORIDE 10 MG: 5 SOLUTION ORAL at 16:45

## 2023-12-28 RX ADMIN — CEFOTETAN DISODIUM 2 G: 2 INJECTION, POWDER, FOR SOLUTION INTRAMUSCULAR; INTRAVENOUS at 14:27

## 2023-12-28 RX ADMIN — ONDANSETRON 4 MG: 2 INJECTION INTRAMUSCULAR; INTRAVENOUS at 15:51

## 2023-12-28 RX ADMIN — LIDOCAINE HYDROCHLORIDE 100 MG: 20 INJECTION, SOLUTION EPIDURAL; INFILTRATION; INTRACAUDAL at 14:27

## 2023-12-28 RX ADMIN — FENTANYL CITRATE 50 MCG: 50 INJECTION, SOLUTION INTRAMUSCULAR; INTRAVENOUS at 17:19

## 2023-12-28 RX ADMIN — GABAPENTIN 300 MG: 300 CAPSULE ORAL at 12:05

## 2023-12-28 ASSESSMENT — PAIN DESCRIPTION - PAIN TYPE
TYPE: SURGICAL PAIN

## 2023-12-28 ASSESSMENT — PAIN SCALES - GENERAL: PAIN_LEVEL: 5

## 2023-12-28 ASSESSMENT — FIBROSIS 4 INDEX: FIB4 SCORE: 0.57

## 2023-12-28 NOTE — ANESTHESIA PREPROCEDURE EVALUATION
Case: [id number] Date/Time: 12/28/23 1215    Procedures:       ROBOTIC HYSTERECTOMY, BILATERAL SALPINGECTOMY OOPHORECTOMY, SENTINEL LYMPH NODE DISSECTION      LYMPHADENECTOMY, ROBOT-ASSISTED, USING DA MALU XI    Pre-op diagnosis: ENDOMETRIAL CANCER    Location: [facility] / SURGERY [facility]    Surgeons: Jesus Rodriguez M.D.            Relevant Problems   PULMONARY   (positive) Asthma in adult       Physical Exam    Airway   Mallampati: II  TM distance: >3 FB  Neck ROM: full       Cardiovascular - normal exam  Rhythm: regular  Rate: normal  (-) murmur     Dental - normal exam  (+) upper dentures           Pulmonary - normal exam  Breath sounds clear to auscultation     Abdominal    Neurological - normal exam                   Anesthesia Plan    ASA 2       Plan - general       Airway plan will be ETT          Induction: intravenous    Postoperative Plan: Postoperative administration of opioids is intended.    Pertinent diagnostic labs and testing reviewed    Informed Consent:    Anesthetic plan and risks discussed with patient.    Use of blood products discussed with: patient whom consented to blood products.

## 2023-12-28 NOTE — ANESTHESIA PROCEDURE NOTES
Airway    Date/Time: 12/28/2023 2:29 PM    Performed by: Wilberto Mg M.D.  Authorized by: Wilberto Mg M.D.    Location:  OR  Urgency:  Elective  Indications for Airway Management:  Anesthesia      Spontaneous Ventilation: absent    Sedation Level:  Deep  Preoxygenated: Yes    Patient Position:  Sniffing  Mask Difficulty Assessment:  1 - vent by mask  Final Airway Type:  Endotracheal airway  Final Endotracheal Airway:  ETT  Cuffed: Yes    Technique Used for Successful ETT Placement:  Direct laryngoscopy  Devices/Methods Used in Placement:  Anterior pressure/BURP    Insertion Site:  Oral  Blade Type:  Romelia  Laryngoscope Blade/Videolaryngoscope Blade Size:  3  ETT Size (mm):  7.0  Measured from:  Teeth  ETT to Teeth (cm):  22  Placement Verified by: auscultation and capnometry    Cormack-Lehane Classification:  Grade IIb - view of arytenoids or posterior of glottis only  Number of Attempts at Approach:  1

## 2023-12-28 NOTE — PROGRESS NOTES
Medication history reviewed with PT at bedside    [facility] is complete per denies    Allergies reviewed.     Patient denies any outpatient antibiotics in the last 30 days.     Patient is not taking anticoagulants.    PT gets vitamin infusions every 2 weeks from [facility] [place] ([phone]) Last infusion was on 12/22/2023    Preferred pharmacy for this visit - [facility] in [place] ([phone])

## 2023-12-29 LAB — PATHOLOGY CONSULT NOTE: NORMAL

## 2023-12-29 NOTE — OP REPORT
PreOp Diagnosis: Grade 1 endometrial cancer        PostOp Diagnosis: Same as above        Procedure(s):  ROBOTIC HYSTERECTOMY, BILATERAL SALPINGO-OOPHRECTOMY - Wound Class: Clean Contaminated  IDENTIFICATION, LYMPH NODE, SENTINEL, INTRAOPERATIVE, USING DYE INJECTION - Wound Class: Clean Contaminated  BIOPSY, LYMPH NODE, SENTINEL - Wound Class: Clean Contaminated     Surgeon(s):  Jesus Rodriguez M.D.     Anesthesiologist/Type of Anesthesia:  Anesthesiologist: Wilberto Mg M.D./General     Surgical Staff:  Circulator: Mireille Simmons  Scrub Person: Coby Lopez; Estela Jaramillo  First Assist: Ryan Medina P.A.-C.     Specimens removed if any:  ID Type Source Tests Collected by Time Destination   A : LEFT EXTERNAL ILIAC SENTINEL LYMPH NODE Tissue Lymph Node PATHOLOGY SPECIMEN Jesus Rodriguez M.D. 12/28/2023  3:10 PM     B : RIGHT EXTERNAL ILIAC SENTINEL LYMPH NODE Tissue Lymph Node PATHOLOGY SPECIMEN Jesus Rodriguez M.D. 12/28/2023  3:10 PM     C : UTERUS, CERVIX, BILATERAL FALLOPIAN TUBES AND OVARIES Tissue Uterus PATHOLOGY SPECIMEN Jesus Rodriguez M.D. 12/28/2023  3:38 PM     D : PELVIC WASHINGS FOR CYTOLOGY Other Other PATHOLOGY SPECIMEN Jesus Rodriguez M.D. 12/28/2023  3:46 PM           Estimated Blood Loss: 50 cc     Findings: No evidence of ascites.  Normal right and left diaphragm.  Liver capsule smooth.  Stomach for grossly normal.  Abdominal peritoneal surfaces were unremarkable.  Omentum.  Grossly normal.     In the pelvis uterus is approximately 10 to 12 weeks in size.  There was no evidence of tumor invaded into the serosa.  Clinically consistent with a fibroid.  The adnexal structures were unremarkable.  There is no seeding along the bladder pelvic and cul-de-sac peritoneum.     [employer] lymph node mapping revealed a symmetrical mapping with sentinel lymph nodes to be noted in the external iliac lymph nodes in the medial aspect of the external iliac artery and vein.        Complications: None     Indication: Ms.  Jose is a pleasant 48 year old  female whose LMP was 23. She has a past medical history significant for diabetes, HTN, obesity, thyroid disease, endometriosis, and seizure disorder and a past surgical history significant for an exploratory laparoscopy/biopsy, D&C due to a miscarriage, and cholecystectomy. She presented to you Dr. Bush on 23 complaining of AUB for over a year. She reported large clots, bleeding between periods, and a longer menses. She underwent a pelvic US on 23 that showed a fibroid that measured 3.31 cm. Uterus measured 10.38 x 5.22 x 5.88 cm. Left ovary measured 2.57 x 2.10 x 1.49 cm. Right ovary measured 2.84 x 2.21 x 1.98 cm. She presented to you again on 10/5/23 for a followup. She underwent an EMB on 10/5/23 which showed endometrial adenocarcinoma with extensive squamous differentiation, FIGO histologic Grade 1. She underwent a follow up ECC which showed tiny fragments of endometrial adenocarcinoma with focal squamous differentiation.     Patient was recommended to undergo robotic hysterectomy with bilateral salpingo-oophorectomy with sentinel lymph node mapping.  Risk benefits and rationale procedures were reviewed with the patient in detail patient's understanding of these risk wished to proceed with surgery as planned.    Procedure:  After achieving adequate general anesthesia, patient was prepped and draped and positioned in modified dorsal lithotomy position. Antibiotics were delivered. Surgical timeout was called. We then proceeded with our intended procedure.     Heavy weighted speculum was placed in the posterior vagina. Cervix was brought to view. The anterior lip of the cervix was grasp with single tooth tenaculum. 2 cc of Indocyanide green was injected into the cervical stroma to a depth about 2-3 mm at 3 and 9 o'clock position. Following completion of this I then proceeded on with the robotic portion of the procedure.    A 1 cm umbilical incision was made.   A Veress needle was inserted without difficulty. Abdominal pressure was noted to be < 5mm Hg.  Pneumoperitoneum was achieved to the abdominal pressure of 15 mmHg.  A 8 mm trocar was inserted without difficulty.  After completion of this, a laparoscope was placed through this port and exploratory laparoscopy revealed the above findings.  Two 8 mm ports were placed in the right upper quadrant 8 cm apart while one 8 mm port was placed in the left upper quadrant 8 cm apart.  After completion of this, the patient was placed in steep Trendelenburg position.  The robotic system was then docked and after docking the robotic system, the instrumentation was inserted under direct laparoscopic visualization to insure that there was no injury to the abdominal contents.  Once this was completed, the robotic camera was then docked.  We then proceeded with our da Josh portion of the procedure.    The posterior leaf of the broad ligament was incised.  The pararectal spaces were developed.  The ureter was identified in the medial leaf of the peritoneum. Underfire fly the sentinel lymph nodes were identified. The sentinel lymph nodes were then skeletonized, isolated and resected. The uterus was then elevated ventrally exposing the cul de sac. Posterior colpotomy incision was then made between the uterosacral ligaments and the sentinel nodes were removed vaginally and send for frozen section. The avascular space of Graves was then created.  The right and left pelvic ureters were identified.  The ovarian vessels were then subsequently isolated and bipolar cautery was used to cauterize the right and left IP and subsequently divided.  The medial leaf of the peritoneum was then incised down to the level of the uterosacral ligament.  Ureters were dissected laterally while developing the [place]ashi space .  The round ligament was then divided followed by the anterior broad leaf ligament. The Prograsp from the  robotic “3rd arm” was then  used to grasp the triple pedicle and counter traction was provided while the cardinal ligament was exposed. Uterine artery and vein were then subsequently skeletonized.  Using the bipolar cautery, the uterine artery and vein were then cauterized juxtaposition to the fundus of the uterus.  The remainder of the lower uterine segment was likewise divided. After completion of this, the uterus was then retracted ventrally and the uterosacral ligaments were then independently divided.  Great care was then taken to clearly not injure the ureter.  After the uterosacral ligaments were then divided, the anterior branches of the uterine vessels were then subsequently skeletonized and cauterized with bipolar cautery and divided.  The bladder peritoneum was then subsequently taken down.  Once we were assured that the bladder was dissected off the paracervical fascia, the posterior colpotomy posterior colpotomy was made.  The vagina was circumferentially incised to complete the hysterectomy and BSO.  The specimen was removed through the vaginal vault without difficulty.  The vaginal cuff was then closed with O [place] PDS suture in 2 layer running fashion.      Pelvic washings were then obtained. The pelvis was then copiously irrigated with water and we established hemostasis. We then accounted for sponges and needles. Once this was confirmed correct, robotic instrumentation with withdraw and pneumoperitoneum was allowed to escape through the 8 mm ports. After appropriate expelling all the intrabdominal pneumoperitoneum, we irrigated the subcutaneous tissue with water. The skin was reapproximated with 3-0 monocryl suture. The incision was then injected with 0.25% Marcaine for local anesthetic effect. Dressings were appropriately placed.     Patient tolerated the procedure well without any difficulties and was extubated and transferred to the PACU in stable excellent condition.       Jesus Rodriguez M.D.

## 2023-12-29 NOTE — OR SURGEON
Immediate Post OP Note    PreOp Diagnosis: Grade 1 endometrial cancer      PostOp Diagnosis: Same as above      Procedure(s):  ROBOTIC HYSTERECTOMY, BILATERAL SALPINGO-OOPHRECTOMY - Wound Class: Clean Contaminated  IDENTIFICATION, LYMPH NODE, SENTINEL, INTRAOPERATIVE, USING DYE INJECTION - Wound Class: Clean Contaminated  BIOPSY, LYMPH NODE, SENTINEL - Wound Class: Clean Contaminated    Surgeon(s):  Jesus Rodriguez M.D.    Anesthesiologist/Type of Anesthesia:  Anesthesiologist: Wilberto Mg M.D./General    Surgical Staff:  Circulator: Mireille Simmons  Scrub Person: Coby Lopez; Estela Jaramillo  First Assist: Ryan Medina P.A.-C.    Specimens removed if any:  ID Type Source Tests Collected by Time Destination   A : LEFT EXTERNAL ILIAC SENTINEL LYMPH NODE Tissue Lymph Node PATHOLOGY SPECIMEN Jesus Rodriguez M.D. 12/28/2023  3:10 PM    B : RIGHT EXTERNAL ILIAC SENTINEL LYMPH NODE Tissue Lymph Node PATHOLOGY SPECIMEN Jesus Rodriguez M.D. 12/28/2023  3:10 PM    C : UTERUS, CERVIX, BILATERAL FALLOPIAN TUBES AND OVARIES Tissue Uterus PATHOLOGY SPECIMEN Jesus Rodriguez M.D. 12/28/2023  3:38 PM    D : PELVIC WASHINGS FOR CYTOLOGY Other Other PATHOLOGY SPECIMEN Jesus Rodriguez M.D. 12/28/2023  3:46 PM        Estimated Blood Loss: 50 cc    Findings: No evidence of ascites.  Normal right and left diaphragm.  Liver capsule smooth.  Stomach for grossly normal.  Abdominal peritoneal surfaces were unremarkable.  Omentum.  Grossly normal.    In the pelvis uterus is approximately 10 to 12 weeks in size.  There was no evidence of tumor invaded into the serosa.  Clinically consistent with a fibroid.  The adnexal structures were unremarkable.  There is no seeding along the bladder pelvic and cul-de-sac peritoneum.    [place] lymph node mapping revealed a symmetrical mapping with sentinel lymph nodes to be noted in the external iliac lymph nodes in the medial aspect of the external iliac artery and vein.      Complications:  None        12/28/2023 6:32 PM Jesus Rodriguez M.D.

## 2023-12-29 NOTE — ANESTHESIA POSTPROCEDURE EVALUATION
Patient: Carole Garcia    Procedure Summary       Date: 12/28/23 Room / Location: [place] [address] / SURGERY [facility]    Anesthesia Start: 1420 Anesthesia Stop: 1616    Procedures:       ROBOTIC HYSTERECTOMY, BILATERAL SALPINGO-OOPHRECTOMY (Pelvis)      IDENTIFICATION, LYMPH NODE, SENTINEL, INTRAOPERATIVE, USING DYE INJECTION (Vagina )      BIOPSY, LYMPH NODE, SENTINEL (Bilateral: Pelvis) Diagnosis: (ENDOMETRIAL CANCER)    Surgeons: Jesus Rodriguez M.D. Responsible Provider: Wilberto Mg M.D.    Anesthesia Type: general ASA Status: 2            Final Anesthesia Type: general  Last vitals  BP   Blood Pressure: 139/74    Temp   36.9 °C (98.4 °F)    Pulse   79   Resp   16    SpO2   89 %      Anesthesia Post Evaluation    Patient location during evaluation: PACU  Patient participation: complete - patient participated  Level of consciousness: awake and alert  Pain score: 5    Airway patency: patent  Anesthetic complications: no  Cardiovascular status: hemodynamically stable  Respiratory status: acceptable  Hydration status: euvolemic    PONV: none          No notable events documented.

## 2023-12-29 NOTE — ANESTHESIA TIME REPORT
Anesthesia Start and Stop Event Times       Date Time Event    12/28/2023 1401 Ready for Procedure     1420 Anesthesia Start     1616 Anesthesia Stop          Responsible Staff  12/28/23      Name Role Begin End    Wilberto Mg M.D. Anesth 1420 1616          Overtime Reason:  no overtime (within assigned shift)    Comments:

## 2023-12-29 NOTE — DISCHARGE INSTRUCTIONS
HOME CARE INSTRUCTIONS    ACTIVITY: Rest and take it easy for the first 24 hours.  A responsible adult is recommended to remain with you during that time.  It is normal to feel sleepy.  We encourage you to not do anything that requires balance, judgment or coordination.    FOR 24 HOURS DO NOT:  Drive, operate machinery or run household appliances.  Drink beer or alcoholic beverages.  Make important decisions or sign legal documents.    SPECIAL INSTRUCTIONS:   Follow up with surgeon in office as instructed or sooner if needed. Call to make follow up appointment if you don't already have one. Also call for questions or concerns.    Call the office [phone] if you develop any fevers, chills, nausea/vomiting, heavy vaginal bleeding or redness, tenderness, and/or drainage from your wounds, persistent watery discharge while walking or stool draining from the vagina.    No heavy lifting greater than 10 pounds for a minimum of 6 weeks, and until cleared by our office.    Nothing in the vagina (ie: no tampons douching, intercourse,) for a minimum of 6 weeks, and until cleared by our office    Showering with warm water is OK, no bathing or swimming or submersing in water.    You may remove wound dressing tomorrow and place loose bandages for the next 2 weeks.    You may have vaginal spotting or light bleeding which is normal.    No driving, alcohol, spending money, signing legal documents, or operating mechanical equipment x 24 hours.    No driving while taking narcotics.    If you have not had a bowel movement for 2 days after surgery, please take over the counter Milk of Magnesium, 1 tablespoon every 4 hours. After 4 doses and if you still have not had a bowel movement, please call your doctor.    You may eat a soft diet, such a soup and liquids, after surgery and if tolerating you may resume your regular diet.    You should have a responsible adult with your for the next 24 hours due to having anesthesia.    A sore  throat is normal today.    If you notice trouble breathing, fever, chills, difficulty urinating, excessive nausea, bleeding, or excessive pain call the Surgeon and come back to the Emergency Room.     DIET: To avoid nausea, slowly advance diet as tolerated, avoiding spicy or greasy foods for the first day.  Add more substantial food to your diet according to your physician's instructions.  INCREASE FLUIDS AND FIBER TO AVOID CONSTIPATION.    MEDICATIONS: Resume taking daily medication.  Take prescribed pain medication with food.  If no medication is prescribed, you may take non-aspirin pain medication if needed.  PAIN MEDICATION CAN BE VERY CONSTIPATING.  Take a stool softener or laxative such as senokot, pericolace, or milk of magnesia if needed.    Last pain medication given at 12pm (1000mg Tylenol), 4:45pm (10mg oxycodone).    A follow-up appointment should be arranged with your doctor; call to schedule.    You should CALL YOUR PHYSICIAN if you develop:  Fever greater than 101 degrees F.  Pain not relieved by medication, or persistent nausea or vomiting.  Excessive bleeding (blood soaking through dressing) or unexpected drainage from the wound.  Extreme redness or swelling around the incision site, drainage of pus or foul smelling drainage.  Inability to urinate or empty your bladder within 8 hours.  Problems with breathing or chest pain.    You should call 911 if you develop problems with breathing or chest pain.  If you are unable to contact your doctor or surgical center, you should go to the nearest emergency room or urgent care center.  Physician's telephone #: [phone]    MILD FLU-LIKE SYMPTOMS ARE NORMAL.  YOU MAY EXPERIENCE GENERALIZED MUSCLE ACHES, THROAT IRRITATION, HEADACHE AND/OR SOME NAUSEA.    If any questions arise, call your doctor.  If your doctor is not available, please feel free to call the Surgical Center at [phone].  The Center is open Monday through Friday from 7AM to 7PM.      A  registered nurse may call you a few days after your surgery to see how you are doing after your procedure.    You may also receive a survey in the mail within the next two weeks and we ask that you take a few moments to complete the survey and return it to us.  Our goal is to provide you with very good care and we value your comments.     Depression / Suicide Risk    As you are discharged from this [facility], it is important to learn how to keep safe from harming yourself.    Recognize the warning signs:  Abrupt changes in personality, positive or negative- including increase in energy   Giving away possessions  Change in eating patterns- significant weight changes-  positive or negative  Change in sleeping patterns- unable to sleep or sleeping all the time   Unwillingness or inability to communicate  Depression  Unusual sadness, discouragement and loneliness  Talk of wanting to die  Neglect of personal appearance   Rebelliousness- reckless behavior  Withdrawal from people/activities they love  Confusion- inability to concentrate     If you or a loved one observes any of these behaviors or has concerns about self-harm, here's what you can do:  Talk about it- your feelings and reasons for harming yourself  Remove any means that you might use to hurt yourself (examples: pills, rope, extension cords, firearm)  Get professional help from the community (Mental Health, Substance Abuse, psychological counseling)  Do not be alone:Call your Safe Contact- someone whom you trust who will be there for you.  Call your local CRISIS HOTLINE 004-4855 or 698-342-3675  Call your local Children's Mobile Crisis Response Team [place] (723) 724-4083 or www.Zignals  Call the toll free National Suicide Prevention Hotlines   National Suicide Prevention Lifeline 260-325-OCTY (7533)  National Hope Line Network 800-SUICIDE (232-2912)    I acknowledge receipt and understanding of these Home Care instructions.

## 2023-12-29 NOTE — OR NURSING
Report received from Kem LINK.    Dressings are and intact - 5 telfa and tegaderm abdominal sites, scant serosanguinous drainage visible. Pt able to void.    1924 - Discharge instructions and follow up appointments were discussed with pt and family. Pt's IV was discontinued and pt was given all belongings. Pt had no other questions. Pt pushed out via wheelchair.

## 2024-01-18 PROBLEM — Q85.81: Status: ACTIVE | Noted: 2024-01-18

## 2024-03-21 ENCOUNTER — HOSPITAL ENCOUNTER (OUTPATIENT)
Dept: RADIOLOGY | Facility: MEDICAL CENTER | Age: 49
End: 2024-03-21
Payer: COMMERCIAL

## 2024-03-29 ENCOUNTER — HOSPITAL ENCOUNTER (OUTPATIENT)
Dept: RADIOLOGY | Facility: MEDICAL CENTER | Age: 49
End: 2024-03-29
Attending: STUDENT IN AN ORGANIZED HEALTH CARE EDUCATION/TRAINING PROGRAM
Payer: COMMERCIAL

## 2024-03-29 DIAGNOSIS — C54.1 MALIGNANT NEOPLASM OF ENDOMETRIUM (HCC): ICD-10-CM

## 2024-03-29 LAB — CYTOLOGY REG CYTOL: NORMAL

## 2024-03-29 PROCEDURE — 10005 FNA BX W/US GDN 1ST LES: CPT

## 2024-03-29 NOTE — PROGRESS NOTES
US guided right thyroid nodule fine needle aspiration done by Dr. Prado; NON-SEDATION (no H&P required as this is a NON SEDATION procedure) Right anterior aspect of neck access site, dressing CDI; 3 samples in 1 jar of cytolyt obtained, 2 samples 1 vial afirma obtained and sent to lab. Pt tolerated the procedure well. Pt hemodynamically stable pre/intra/post procedure; all questions and concerns answered prior to being d/c; patient provided with appropriate education for procedure; pt d/c home.elysia education for procedure; pt d/c home.

## 2024-04-14 ENCOUNTER — OFFICE VISIT (OUTPATIENT)
Dept: URGENT CARE | Facility: PHYSICIAN GROUP | Age: 49
End: 2024-04-14
Payer: COMMERCIAL

## 2024-04-14 VITALS
RESPIRATION RATE: 16 BRPM | HEIGHT: 64 IN | DIASTOLIC BLOOD PRESSURE: 70 MMHG | HEART RATE: 85 BPM | BODY MASS INDEX: 36.54 KG/M2 | TEMPERATURE: 98.6 F | SYSTOLIC BLOOD PRESSURE: 124 MMHG | OXYGEN SATURATION: 97 % | WEIGHT: 214 LBS

## 2024-04-14 DIAGNOSIS — J01.00 ACUTE NON-RECURRENT MAXILLARY SINUSITIS: ICD-10-CM

## 2024-04-14 PROCEDURE — 99213 OFFICE O/P EST LOW 20 MIN: CPT | Performed by: NURSE PRACTITIONER

## 2024-04-14 PROCEDURE — 3074F SYST BP LT 130 MM HG: CPT | Performed by: NURSE PRACTITIONER

## 2024-04-14 PROCEDURE — 3078F DIAST BP <80 MM HG: CPT | Performed by: NURSE PRACTITIONER

## 2024-04-14 RX ORDER — METHYLPREDNISOLONE 4 MG/1
TABLET ORAL
Qty: 21 TABLET | Refills: 0 | Status: SHIPPED | OUTPATIENT
Start: 2024-04-14

## 2024-04-14 RX ORDER — AMOXICILLIN AND CLAVULANATE POTASSIUM 875; 125 MG/1; MG/1
1 TABLET, FILM COATED ORAL 2 TIMES DAILY
Qty: 14 TABLET | Refills: 0 | Status: SHIPPED | OUTPATIENT
Start: 2024-04-14 | End: 2024-04-21

## 2024-04-14 RX ORDER — BUDESONIDE AND FORMOTEROL FUMARATE DIHYDRATE 80; 4.5 UG/1; UG/1
1 AEROSOL RESPIRATORY (INHALATION)
COMMUNITY

## 2024-04-14 RX ORDER — OMEPRAZOLE 40 MG/1
40 CAPSULE, DELAYED RELEASE ORAL
COMMUNITY
Start: 2023-11-29

## 2024-04-14 RX ORDER — POLYETHYLENE GLYCOL-3350 AND ELECTROLYTES 236; 6.74; 5.86; 2.97; 22.74 G/274.31G; G/274.31G; G/274.31G; G/274.31G; G/274.31G
POWDER, FOR SOLUTION ORAL
COMMUNITY
Start: 2024-01-16

## 2024-04-14 RX ORDER — DULOXETIN HYDROCHLORIDE 30 MG/1
1 CAPSULE, DELAYED RELEASE ORAL EVERY MORNING
COMMUNITY
Start: 2024-03-12

## 2024-04-14 RX ORDER — ESTRADIOL 0.1 MG/G
CREAM VAGINAL
COMMUNITY
Start: 2024-04-09

## 2024-04-14 RX ORDER — ALBUTEROL SULFATE 90 UG/1
2 AEROSOL, METERED RESPIRATORY (INHALATION)
COMMUNITY
End: 2024-04-14

## 2024-04-14 RX ORDER — PSYLLIUM HUSK 0.4 G
2 CAPSULE ORAL
COMMUNITY
End: 2024-04-14

## 2024-04-14 ASSESSMENT — ENCOUNTER SYMPTOMS
SINUS PAIN: 1
HEADACHES: 1
NAUSEA: 0
COUGH: 1
FEVER: 0
FATIGUE: 1
VOMITING: 0
CHILLS: 1
SORE THROAT: 0

## 2024-04-14 ASSESSMENT — FIBROSIS 4 INDEX: FIB4 SCORE: 0.57

## 2024-04-14 NOTE — PROGRESS NOTES
Subjective:   Carole Garcia is a 48 y.o. female who presents for Cough (X 3 days), Congestion (X 3 days, green mucous pt states), and Sinus Pain      Sinus Pain  This is a new problem. The current episode started in the past 7 days. The problem has been gradually worsening. Associated symptoms include chills, congestion, coughing, fatigue and headaches. Pertinent negatives include no fever, nausea, sore throat or vomiting. She has tried acetaminophen for the symptoms.       Review of Systems   Constitutional:  Positive for chills, fatigue and malaise/fatigue. Negative for fever.   HENT:  Positive for congestion and sinus pain. Negative for sore throat.    Respiratory:  Positive for cough.    Gastrointestinal:  Negative for nausea and vomiting.   Neurological:  Positive for headaches.       Medications:    acetaminophen Tabs  albuterol Aers  amoxicillin-clavulanate Tabs  budesonide-formoterol Aero  DULoxetine Cpep  estradiol  GaviLyte-G Solr  ibuprofen Tabs  methylPREDNISolone Tbpk  omeprazole  sennosides Tabs  SYSTANE ICAPS AREDS2 PO  vitamin D3 Tabs    Allergies: Patient has no known allergies.    Problem List: Carole Garcia does not have any pertinent problems on file.    Surgical History:  Past Surgical History:   Procedure Laterality Date    NC INTRAOP ID SENTINEL NODE  12/28/2023    Procedure: IDENTIFICATION, LYMPH NODE, SENTINEL, INTRAOPERATIVE, USING DYE INJECTION;  Surgeon: Jesus Rodriguez M.D.;  Location: [facility];  Service: Gyn Robotic    HYSTERECTOMY ROBOTIC XI  12/28/2023    Procedure: ROBOTIC HYSTERECTOMY, BILATERAL SALPINGO-OOPHRECTOMY;  Surgeon: Jesus Rodriguez M.D.;  Location: SURGERY [facility];  Service: Gyn Robotic    NODE BIOPSY SENTINEL Bilateral 12/28/2023    Procedure: BIOPSY, LYMPH NODE, SENTINEL;  Surgeon: Jesus Rodriguez M.D.;  Location: [facility];  Service: Gyn Robotic    CHOLECYSTECTOMY  01/01/1999    endometriosis    DILATION AND CURETTAGE      miscarraige    OTHER    "   Gallbladder, tonsils, carbuncle    TONSILLECTOMY AND ADENOIDECTOMY      as a child       Past Social Hx: Carole Garcia  reports that she quit smoking about 6 years ago. Her smoking use included cigarettes. She started smoking about 25 years ago. She has a 17 pack-year smoking history. She has never used smokeless tobacco. She reports that she does not currently use alcohol after a past usage of about 1.2 oz of alcohol per week. She reports that she does not currently use drugs after having used the following drugs: Oral.     Past Family Hx:  Carole Garcia family history includes Alcohol/Drug in her maternal grandfather; Cancer (age of onset: 57) in her father; DVT in her mother; Diabetes in her maternal aunt, maternal grandmother, and mother; Genetic Disorder in her brother; Heart Disease in her maternal aunt, maternal grandmother, and mother; Hyperlipidemia in her maternal aunt, maternal grandmother, and mother; Hypertension in her maternal aunt, maternal grandmother, and mother.     Problem list, medications, and allergies reviewed by myself today in Epic.     Objective:     /70   Pulse 85   Temp 37 °C (98.6 °F) (Temporal)   Resp 16   Ht 1.626 m (5' 4\")   Wt 97.1 kg (214 lb)   SpO2 97%   BMI 36.73 kg/m²     Physical Exam  Vitals and nursing note reviewed.   Constitutional:       General: She is not in acute distress.     Appearance: She is well-developed.   HENT:      Head: Normocephalic and atraumatic.      Right Ear: Tympanic membrane and external ear normal.      Left Ear: Tympanic membrane and external ear normal.      Nose:      Right Sinus: Maxillary sinus tenderness present. No frontal sinus tenderness.      Left Sinus: Maxillary sinus tenderness present. No frontal sinus tenderness.      Mouth/Throat:      Mouth: Mucous membranes are moist.      Pharynx: Uvula midline. No posterior oropharyngeal erythema.      Tonsils: No tonsillar exudate or tonsillar abscesses.   Eyes:      General: "         Right eye: No discharge.         Left eye: No discharge.      Conjunctiva/sclera: Conjunctivae normal.   Cardiovascular:      Rate and Rhythm: Normal rate.   Pulmonary:      Effort: Pulmonary effort is normal. No respiratory distress.      Breath sounds: Normal breath sounds.   Abdominal:      General: There is no distension.   Musculoskeletal:         General: Normal range of motion.   Skin:     General: Skin is warm and dry.   Neurological:      General: No focal deficit present.      Mental Status: She is alert and oriented to person, place, and time. Mental status is at baseline.      Gait: Gait (gait at baseline) normal.   Psychiatric:         Judgment: Judgment normal.         Assessment/Plan:     Diagnosis and associated orders:     1. Acute non-recurrent maxillary sinusitis  amoxicillin-clavulanate (AUGMENTIN) 875-125 MG Tab    methylPREDNISolone (MEDROL DOSEPAK) 4 MG Tablet Therapy Pack         Comments/MDM:     I personally reviewed prior external notes and prior test results pertinent to today's visit.   Discussed management options, risks and benefits, and alternatives to treatment plan agreed upon.   Red flags discussed and indications to immediately call 911 or present to the Emergency Department.   Supportive care, differential diagnoses, and indications for immediate follow-up discussed with patient.    Patient expresses understanding and agrees to plan. Patient denies any other questions or concerns.              Please note that this dictation was created using voice recognition software. I have made a reasonable attempt to correct obvious errors, but I expect that there are errors of grammar and possibly content that I did not discover before finalizing the note.    This note was electronically signed by Berlin COFFMAN.

## 2024-04-25 ENCOUNTER — HOSPITAL ENCOUNTER (OUTPATIENT)
Dept: RADIOLOGY | Facility: MEDICAL CENTER | Age: 49
End: 2024-04-25
Attending: STUDENT IN AN ORGANIZED HEALTH CARE EDUCATION/TRAINING PROGRAM
Payer: COMMERCIAL

## 2024-04-25 DIAGNOSIS — C54.1 ENDOMETRIAL SARCOMA (HCC): ICD-10-CM

## 2024-04-25 PROCEDURE — 76775 US EXAM ABDO BACK WALL LIM: CPT

## 2024-04-26 ENCOUNTER — APPOINTMENT (RX ONLY)
Dept: URBAN - METROPOLITAN AREA CLINIC 31 | Facility: CLINIC | Age: 49
Setting detail: DERMATOLOGY
End: 2024-04-26

## 2024-04-26 DIAGNOSIS — D22 MELANOCYTIC NEVI: ICD-10-CM

## 2024-04-26 DIAGNOSIS — Q85.82 OTHER COWDEN SYNDROME: ICD-10-CM

## 2024-04-26 DIAGNOSIS — D18.0 HEMANGIOMA: ICD-10-CM

## 2024-04-26 DIAGNOSIS — Z71.89 OTHER SPECIFIED COUNSELING: ICD-10-CM

## 2024-04-26 DIAGNOSIS — L81.4 OTHER MELANIN HYPERPIGMENTATION: ICD-10-CM

## 2024-04-26 PROBLEM — D22.5 MELANOCYTIC NEVI OF TRUNK: Status: ACTIVE | Noted: 2024-04-26

## 2024-04-26 PROBLEM — D18.01 HEMANGIOMA OF SKIN AND SUBCUTANEOUS TISSUE: Status: ACTIVE | Noted: 2024-04-26

## 2024-04-26 PROCEDURE — 99203 OFFICE O/P NEW LOW 30 MIN: CPT

## 2024-04-26 PROCEDURE — ? COUNSELING

## 2024-04-26 ASSESSMENT — LOCATION DETAILED DESCRIPTION DERM
LOCATION DETAILED: RIGHT SUPERIOR UPPER BACK
LOCATION DETAILED: RIGHT INFERIOR UPPER BACK
LOCATION DETAILED: RIGHT MID-UPPER BACK

## 2024-04-26 ASSESSMENT — LOCATION SIMPLE DESCRIPTION DERM: LOCATION SIMPLE: RIGHT UPPER BACK

## 2024-04-26 ASSESSMENT — LOCATION ZONE DERM: LOCATION ZONE: TRUNK

## 2024-04-26 NOTE — PROCEDURE: COUNSELING
Detail Level: Generalized
Sunscreen Recommendations: Sun screen (SPF 30 or greater) should be applied during peak UV exposure (between 10am and 2pm) and reapplied after exercise or swimming.\\nRecommended La Roche- Posay Anthelios with SPF 60 or Kemar Tone Water Babies with SPF 30 and above.
Detail Level: Detailed

## 2024-05-21 ENCOUNTER — TELEPHONE (OUTPATIENT)
Dept: MEDICAL GROUP | Facility: CLINIC | Age: 49
End: 2024-05-21
Payer: COMMERCIAL

## 2024-05-21 NOTE — TELEPHONE ENCOUNTER
Requesting prior authorization for Budesonide-Formoterol Fumarate 80-4.5MCG/ACT aerosol    PA Outcome pending   [place] [employer]   Reference #? [id number]

## 2024-05-22 ENCOUNTER — TELEPHONE (OUTPATIENT)
Dept: MEDICAL GROUP | Facility: CLINIC | Age: 49
End: 2024-05-22
Payer: COMMERCIAL

## 2024-05-22 NOTE — TELEPHONE ENCOUNTER
Requesting prior authorization for Budesonide-Formoterol Fumarate 80-4.5MCG/ACT aerosol    PA Outcome pending   [place] / [employer]   Reference #? [id number]

## 2024-05-28 ENCOUNTER — HOSPITAL ENCOUNTER (OUTPATIENT)
Dept: RADIOLOGY | Facility: MEDICAL CENTER | Age: 49
End: 2024-05-28
Attending: STUDENT IN AN ORGANIZED HEALTH CARE EDUCATION/TRAINING PROGRAM
Payer: COMMERCIAL

## 2024-05-28 DIAGNOSIS — C54.1 ENDOMETRIAL SARCOMA (HCC): ICD-10-CM

## 2024-05-28 RX ADMIN — GADOTERIDOL 20 ML: 279.3 INJECTION, SOLUTION INTRAVENOUS at 18:38

## 2024-05-28 NOTE — TELEPHONE ENCOUNTER
FINAL PRIOR AUTHORIZATION STATUS:    1.  Name of Medication & Dose: Budesonide-Formoterol Fumarate 80-4.5MCG/ACT aerosol      2. Prior Auth Status: Denied.  Reason: 1. You have tried other drugs your plan covers (preferred meds) and they did not work for you 2. You doctor gives us a medical reason you cannot take the other drugs (preferred medicaiton)  for patient's plan she may need to try 3 or more preferred medications.

## 2024-07-17 DIAGNOSIS — R10.13 DYSPEPSIA: ICD-10-CM

## 2024-07-17 RX ORDER — OMEPRAZOLE 40 MG/1
40 CAPSULE, DELAYED RELEASE ORAL DAILY
Qty: 90 CAPSULE | Refills: 0 | Status: SHIPPED | OUTPATIENT
Start: 2024-07-17

## 2024-09-20 ENCOUNTER — HOSPITAL ENCOUNTER (OUTPATIENT)
Dept: RADIOLOGY | Facility: MEDICAL CENTER | Age: 49
End: 2024-09-20
Payer: COMMERCIAL

## 2024-10-20 DIAGNOSIS — R10.13 DYSPEPSIA: ICD-10-CM

## 2024-10-22 RX ORDER — OMEPRAZOLE 40 MG/1
40 CAPSULE, DELAYED RELEASE ORAL DAILY
Qty: 30 CAPSULE | Refills: 0 | Status: SHIPPED | OUTPATIENT
Start: 2024-10-22

## 2024-10-30 ENCOUNTER — APPOINTMENT (OUTPATIENT)
Dept: MEDICAL GROUP | Facility: CLINIC | Age: 49
End: 2024-10-30
Payer: COMMERCIAL

## 2024-10-30 VITALS
SYSTOLIC BLOOD PRESSURE: 120 MMHG | HEIGHT: 64 IN | WEIGHT: 223.77 LBS | BODY MASS INDEX: 38.2 KG/M2 | TEMPERATURE: 97.3 F | DIASTOLIC BLOOD PRESSURE: 82 MMHG | OXYGEN SATURATION: 95 % | RESPIRATION RATE: 20 BRPM | HEART RATE: 74 BPM

## 2024-10-30 DIAGNOSIS — R60.0 LOCALIZED EDEMA: ICD-10-CM

## 2024-10-30 DIAGNOSIS — E66.812 CLASS 2 OBESITY DUE TO EXCESS CALORIES WITHOUT SERIOUS COMORBIDITY WITH BODY MASS INDEX (BMI) OF 38.0 TO 38.9 IN ADULT: ICD-10-CM

## 2024-10-30 DIAGNOSIS — J45.40 MODERATE PERSISTENT ASTHMA IN ADULT WITHOUT COMPLICATION: ICD-10-CM

## 2024-10-30 DIAGNOSIS — Q85.81: ICD-10-CM

## 2024-10-30 DIAGNOSIS — C54.1 ENDOMETRIAL ADENOCARCINOMA (HCC): ICD-10-CM

## 2024-10-30 DIAGNOSIS — E66.09 CLASS 2 OBESITY DUE TO EXCESS CALORIES WITHOUT SERIOUS COMORBIDITY WITH BODY MASS INDEX (BMI) OF 38.0 TO 38.9 IN ADULT: ICD-10-CM

## 2024-10-30 DIAGNOSIS — E04.1 THYROID NODULE: ICD-10-CM

## 2024-10-30 RX ORDER — ALBUTEROL SULFATE 90 UG/1
2 INHALANT RESPIRATORY (INHALATION) EVERY 4 HOURS PRN
Qty: 54 G | Refills: 2 | Status: SHIPPED | OUTPATIENT
Start: 2024-10-30

## 2024-10-30 ASSESSMENT — FIBROSIS 4 INDEX: FIB4 SCORE: 0.58

## 2024-10-30 ASSESSMENT — PATIENT HEALTH QUESTIONNAIRE - PHQ9: CLINICAL INTERPRETATION OF PHQ2 SCORE: 0

## 2024-10-31 ENCOUNTER — APPOINTMENT (RX ONLY)
Dept: URBAN - METROPOLITAN AREA CLINIC 31 | Facility: CLINIC | Age: 49
Setting detail: DERMATOLOGY
End: 2024-10-31

## 2024-10-31 DIAGNOSIS — L91.8 OTHER HYPERTROPHIC DISORDERS OF THE SKIN: ICD-10-CM

## 2024-10-31 DIAGNOSIS — Z71.89 OTHER SPECIFIED COUNSELING: ICD-10-CM

## 2024-10-31 DIAGNOSIS — Q85.82 OTHER COWDEN SYNDROME: ICD-10-CM

## 2024-10-31 DIAGNOSIS — D18.0 HEMANGIOMA: ICD-10-CM

## 2024-10-31 DIAGNOSIS — L81.4 OTHER MELANIN HYPERPIGMENTATION: ICD-10-CM

## 2024-10-31 DIAGNOSIS — L82.1 OTHER SEBORRHEIC KERATOSIS: ICD-10-CM

## 2024-10-31 DIAGNOSIS — D22 MELANOCYTIC NEVI: ICD-10-CM

## 2024-10-31 PROBLEM — D18.01 HEMANGIOMA OF SKIN AND SUBCUTANEOUS TISSUE: Status: ACTIVE | Noted: 2024-10-31

## 2024-10-31 PROBLEM — D22.5 MELANOCYTIC NEVI OF TRUNK: Status: ACTIVE | Noted: 2024-10-31

## 2024-10-31 PROCEDURE — ? COUNSELING

## 2024-10-31 PROCEDURE — 99213 OFFICE O/P EST LOW 20 MIN: CPT

## 2024-10-31 PROCEDURE — ? BENIGN DESTRUCTION COSMETIC

## 2024-10-31 ASSESSMENT — LOCATION SIMPLE DESCRIPTION DERM
LOCATION SIMPLE: RIGHT ANTERIOR AXILLA
LOCATION SIMPLE: LEFT AXILLARY VAULT
LOCATION SIMPLE: LEFT SHOULDER
LOCATION SIMPLE: RIGHT LOWER BACK
LOCATION SIMPLE: RIGHT UPPER BACK

## 2024-10-31 ASSESSMENT — LOCATION DETAILED DESCRIPTION DERM
LOCATION DETAILED: LEFT AXILLARY VAULT
LOCATION DETAILED: RIGHT INFERIOR UPPER BACK
LOCATION DETAILED: RIGHT SUPERIOR UPPER BACK
LOCATION DETAILED: RIGHT INFERIOR MEDIAL MIDBACK
LOCATION DETAILED: LEFT ANTERIOR SHOULDER
LOCATION DETAILED: RIGHT ANTERIOR AXILLA

## 2024-10-31 ASSESSMENT — LOCATION ZONE DERM
LOCATION ZONE: TRUNK
LOCATION ZONE: AXILLAE
LOCATION ZONE: ARM

## 2024-10-31 NOTE — PROCEDURE: BENIGN DESTRUCTION COSMETIC
Price (Use Numbers Only, No Special Characters Or $): 160
Anesthesia Volume In Cc: 0.5
Detail Level: Detailed
Post-Care Instructions: I reviewed with the patient in detail post-care instructions. Patient is to wear sunprotection, and avoid picking at any of the treated lesions. Pt may apply Vaseline to crusted or scabbing areas.
Consent: The patient's consent was obtained including but not limited to risks of crusting, scabbing, blistering, scarring, darker or lighter pigmentary change, recurrence, incomplete removal and infection.

## 2024-11-07 ENCOUNTER — TELEPHONE (OUTPATIENT)
Dept: MEDICAL GROUP | Facility: CLINIC | Age: 49
End: 2024-11-07
Payer: COMMERCIAL

## 2024-11-08 NOTE — TELEPHONE ENCOUNTER
DOCUMENTATION OF PAR STATUS:    1. Name of Medication & Dose: Mounjaro     2. Name of Prescription Coverage Company & phone #: [employer]     3. Date Prior Auth Submitted: 11/7/24    4. What information was given to obtain insurance decision? Dx code     5. Prior Auth Status? Denied        6. Patient Notified: N\A

## 2024-11-15 LAB
T3 SERPL-MCNC: 131 NG/DL (ref 71–180)
T4 FREE SERPL-MCNC: 0.92 NG/DL (ref 0.82–1.77)
THYROGLOB AB SERPL-ACNC: 4.2 IU/ML (ref 0–0.9)
THYROPEROXIDASE AB SERPL-ACNC: 76 IU/ML (ref 0–34)
TSH SERPL DL<=0.005 MIU/L-ACNC: 5.68 UIU/ML (ref 0.45–4.5)

## 2024-11-17 DIAGNOSIS — R10.13 DYSPEPSIA: ICD-10-CM

## 2024-11-18 NOTE — TELEPHONE ENCOUNTER
Requested Prescriptions     Pending Prescriptions Disp Refills    omeprazole (PRILOSEC) 40 MG delayed-release capsule [Pharmacy Med Name: OMEPRAZOLE DR 40 MG CAPSULE] 30 Capsule 0     Sig: TAKE 1 CAPSULE BY MOUTH DAILY     Last office visit: 10/30/24  Last lab: 11/11/24

## 2024-11-19 RX ORDER — OMEPRAZOLE 40 MG/1
40 CAPSULE, DELAYED RELEASE ORAL DAILY
Qty: 90 CAPSULE | Refills: 2 | Status: SHIPPED | OUTPATIENT
Start: 2024-11-19

## 2024-12-24 ENCOUNTER — ANCILLARY PROCEDURE (OUTPATIENT)
Dept: CARDIOLOGY | Facility: MEDICAL CENTER | Age: 49
End: 2024-12-24
Attending: PHYSICIAN ASSISTANT
Payer: COMMERCIAL

## 2024-12-24 DIAGNOSIS — R60.0 LOCALIZED EDEMA: ICD-10-CM

## 2024-12-24 LAB
LV EJECT FRACT MOD 2C 99903: 57.53
LV EJECT FRACT MOD 4C 99902: 55.42
LV EJECT FRACT MOD BP 99901: 54.96

## 2024-12-24 PROCEDURE — 93306 TTE W/DOPPLER COMPLETE: CPT | Mod: 26 | Performed by: STUDENT IN AN ORGANIZED HEALTH CARE EDUCATION/TRAINING PROGRAM

## 2024-12-24 PROCEDURE — 93306 TTE W/DOPPLER COMPLETE: CPT

## 2025-01-21 ENCOUNTER — APPOINTMENT (OUTPATIENT)
Dept: MEDICAL GROUP | Facility: CLINIC | Age: 50
End: 2025-01-21
Payer: COMMERCIAL

## 2025-01-21 VITALS
WEIGHT: 229.28 LBS | BODY MASS INDEX: 39.14 KG/M2 | DIASTOLIC BLOOD PRESSURE: 80 MMHG | TEMPERATURE: 98.1 F | RESPIRATION RATE: 18 BRPM | HEIGHT: 64 IN | SYSTOLIC BLOOD PRESSURE: 142 MMHG | HEART RATE: 96 BPM | OXYGEN SATURATION: 97 %

## 2025-01-21 DIAGNOSIS — E06.3 HYPOTHYROIDISM DUE TO HASHIMOTO THYROIDITIS: ICD-10-CM

## 2025-01-21 DIAGNOSIS — E66.812 CLASS 2 OBESITY DUE TO EXCESS CALORIES WITHOUT SERIOUS COMORBIDITY WITH BODY MASS INDEX (BMI) OF 39.0 TO 39.9 IN ADULT: ICD-10-CM

## 2025-01-21 DIAGNOSIS — R60.0 LOCALIZED EDEMA: ICD-10-CM

## 2025-01-21 DIAGNOSIS — C54.1 ENDOMETRIAL ADENOCARCINOMA (HCC): ICD-10-CM

## 2025-01-21 DIAGNOSIS — Q85.81: ICD-10-CM

## 2025-01-21 DIAGNOSIS — E66.09 CLASS 2 OBESITY DUE TO EXCESS CALORIES WITHOUT SERIOUS COMORBIDITY WITH BODY MASS INDEX (BMI) OF 39.0 TO 39.9 IN ADULT: ICD-10-CM

## 2025-01-21 DIAGNOSIS — J45.41 MODERATE PERSISTENT ASTHMA WITH ACUTE EXACERBATION IN ADULT: ICD-10-CM

## 2025-01-21 DIAGNOSIS — R91.1 LUNG NODULE: ICD-10-CM

## 2025-01-21 PROCEDURE — 99214 OFFICE O/P EST MOD 30 MIN: CPT | Performed by: PHYSICIAN ASSISTANT

## 2025-01-21 PROCEDURE — 3077F SYST BP >= 140 MM HG: CPT | Performed by: PHYSICIAN ASSISTANT

## 2025-01-21 PROCEDURE — 3079F DIAST BP 80-89 MM HG: CPT | Performed by: PHYSICIAN ASSISTANT

## 2025-01-21 RX ORDER — LEVOTHYROXINE SODIUM 25 UG/1
25 TABLET ORAL
Qty: 90 TABLET | Refills: 1 | Status: SHIPPED | OUTPATIENT
Start: 2025-01-21

## 2025-01-21 ASSESSMENT — PATIENT HEALTH QUESTIONNAIRE - PHQ9: CLINICAL INTERPRETATION OF PHQ2 SCORE: 0

## 2025-01-21 ASSESSMENT — FIBROSIS 4 INDEX: FIB4 SCORE: 0.58

## 2025-01-21 NOTE — LETTER
[facility]  Janet Guerrero P.A.-C.  [address] [address]  Fax: [phone]   Authorization for Release/Disclosure of   Protected Health Information   Name: CAROLE GARCIA : 1975 SSN: [id number]   Address: [address] [place] [id number] Phone:    There are no phone numbers on file.   I authorize the entity listed below to release/disclose the PHI below to:   [facility]/Janet Guerrero P.A.-C. and Janet Guerrero P.A.-C.   Provider or Entity Name: [facility]      Address   City, State, Zip   Phone:      Fax:     Reason for request: continuity of care   Information to be released:    [ xx ] LAST COLONOSCOPY,  including any PATH REPORT and follow-up  [  ] LAST FIT/COLOGUARD RESULT [  ] LAST DEXA  [  ] LAST MAMMOGRAM  [  ] LAST PAP  [  ] LAST LABS [  ] RETINA EXAM REPORT  [  ] IMMUNIZATION RECORDS  [  ] Release all info      [  ] Check here and initial the line next to each item to release ALL health information INCLUDING  _____ Care and treatment for drug and / or alcohol abuse  _____ HIV testing, infection status, or AIDS  _____ Genetic Testing    DATES OF SERVICE OR TIME PERIOD TO BE DISCLOSED: _____________  I understand and acknowledge that:  * This Authorization may be revoked at any time by you in writing, except if your health information has already been used or disclosed.  * Your health information that will be used or disclosed as a result of you signing this authorization could be re-disclosed by the recipient. If this occurs, your re-disclosed health information may no longer be protected by State or Federal laws.  * You may refuse to sign this Authorization. Your refusal will not affect your ability to obtain treatment.  * This Authorization becomes effective upon signing and will  on (date) __________.      If no date is indicated, this Authorization will  one (1) year from the signature date.    Name: Carole Garcia  Signature:  Date:   1/21/2025     PLEASE FAX REQUESTED RECORDS BACK TO: [phone]

## 2025-01-21 NOTE — PROGRESS NOTES
cc:  thyroid    Subjective:     Carole Garcia is a 49 y.o. female presenting for thyroid        History of Present Illness  The patient is a 49-year-old female who presents to the office today to follow up with test results, which indicate hypothyroidism. She also had an echocardiogram.    She is currently not on any thyroid medication. During her consultation with Dr. Downing, they discussed her thyroid condition and the challenges she faced in scheduling an appointment with an endocrinologist. Dr. Downing suggested that either an endocrinologist or her primary care physician could initiate thyroid treatment if necessary.    She was recently informed by her surgeon about the presence of a lung nodule, which was previously unknown to her. This discovery was made during a CT scan. She has undergone two chest x-rays, one prior to surgery and another last year. A repeat CT scan has been ordered. She plans to request copies of her previous scans for comparison. She has a history of smoking.    She has been experiencing leg swelling and pain since her first post-surgery visit. Despite being told that these symptoms are not related to her surgery, she insists they were not present pre-surgery. She was informed that the swelling could be a side effect of radiation therapy, which should subside within 3 to 6 months. However, her radiation oncologist disagrees and has ordered an ultrasound of her legs. She has a long history of working as a CNA, which involved prolonged standing. She has noticed varicose veins and spider veins. She has started wearing compression stockings every other day, which seem to help with the swelling. She has also received a note from Dr. Rodriguez's office advising her to avoid wearing safety boots at work.    She has been struggling with weight loss. She experiences leg swelling and pain when she exercises, and her asthma tends to flare up, requiring the use of her inhaler. She has been advised to  elevate her feet and rest to alleviate the swelling. She is scheduled to start lymphedema physical therapy on 03/07/2024.    She has a tumor.    Supplemental Information  She may have a small kidney stone, but she does not feel it.    SOCIAL HISTORY  She has a history of smoking.       Review of systems:  See above.   Denies any symptoms unless previously indicated.        Current Outpatient Medications:     levothyroxine (SYNTHROID) 25 MCG Tab, Take 1 Tablet by mouth every morning on an empty stomach., Disp: 90 Tablet, Rfl: 1    omeprazole (PRILOSEC) 40 MG delayed-release capsule, TAKE 1 CAPSULE BY MOUTH DAILY, Disp: 90 Capsule, Rfl: 2    Fezolinetant 45 MG Tab, Take 45 mg by mouth every day., Disp: , Rfl:     albuterol (PROVENTIL HFA) 108 (90 Base) MCG/ACT Aero Soln inhalation aerosol, Inhale 2 Puffs every four hours as needed for Shortness of Breath., Disp: 54 g, Rfl: 2    Multiple Vitamins-Minerals (SYSTANE ICAPS AREDS2 PO), Take 2 Capsules by mouth every day., Disp: , Rfl:     acetaminophen (TYLENOL) 500 MG Tab, Take 1,000 mg by mouth 1 time a day as needed for Moderate Pain., Disp: , Rfl:     ibuprofen (MOTRIN) 200 MG Tab, Take 400 mg by mouth 2 times a day as needed for Mild Pain., Disp: , Rfl:     vitamin D3 (CHOLECALCIFEROL) 1000 Unit (25 mcg) Tab, Take 2,000 Units by mouth every day., Disp: , Rfl:     NON SPECIFIED, Take 3 Tablets by mouth every day. (Patient not taking: Reported on 1/21/2025), Disp: , Rfl:     fluticasone furoate (ARNUITY ELLIPTA) 100 MCG/ACT AEROSOL POWDER, BREATH ACTIVATED inhaler, Inhale 1 Puff every day. (Patient not taking: Reported on 1/21/2025), Disp: 30 Each, Rfl: 2    Tirzepatide (MOUNJARO) 5 MG/0.5ML Solution Pen-injector, Inject 2.5 mg under the skin every 7 days. (Patient not taking: Reported on 1/21/2025), Disp: 9 mL, Rfl: 2    DULoxetine (CYMBALTA) 30 MG Cap DR Particles, Take 1 Capsule by mouth every morning. (Patient not taking: Reported on 1/21/2025), Disp: , Rfl:      "estradiol (ESTRACE) 0.1 MG/GM vaginal cream, , Disp: , Rfl:     GAVILYTE-G 236 g Recon Soln, , Disp: , Rfl:     budesonide-formoterol (SYMBICORT) 80-4.5 MCG/ACT Aerosol, Inhale 1 Puff. (Patient not taking: Reported on 1/21/2025), Disp: , Rfl:     methylPREDNISolone (MEDROL DOSEPAK) 4 MG Tablet Therapy Pack, Follow schedule on package instructions. (Patient not taking: Reported on 1/21/2025), Disp: 21 Tablet, Rfl: 0    budesonide-formoterol (SYMBICORT) 80-4.5 MCG/ACT Aerosol, Inhale 1 Puff 2 times a day. (Patient not taking: Reported on 10/30/2024), Disp: 1 Each, Rfl: 5    sennosides (SENOKOT) 8.6 MG Tab, Take 1 Tablet by mouth every day. (Patient not taking: Reported on 10/30/2024), Disp: 30 Tablet, Rfl: 0    Allergies, past medical history, past surgical history, family history, social history reviewed and updated    Objective:     Vitals: BP (!) 142/80 (BP Location: Left arm, Patient Position: Sitting, BP Cuff Size: Large adult)   Pulse 96   Temp 36.7 °C (98.1 °F) (Temporal)   Resp 18   Ht 1.626 m (5' 4\")   Wt 104 kg (229 lb 4.5 oz)   LMP 12/02/2023   SpO2 97%   BMI 39.36 kg/m²   General: Alert, pleasant, NAD  EYES:   PERRL, EOMI, no icterus or pallor.  Conjunctivae and lids normal.   HENT:  Normocephalic.  External ears normal.   Neck supple.     Respiratory: Normal respiratory effort.    Abdomen: obese  Skin: Warm, dry, no rashes.  Musculoskeletal: Gait is normal.  Moves all extremities well.    Extremities: normal range of motion all extremities.   Neurological: No tremors, sensation grossly intact, CN2-12 intact.  Psych:  Affect/mood is normal, judgement is good, memory is intact, grooming is appropriate.    Results  Laboratory Studies  TSH levels indicate hypothyroidism. Elevated antibody levels suggest Hashimoto's thyroiditis. Red blood cell size slightly off by 210s of a point. Iron levels improved. Folic acid levels normal. Labs from December 2023 showed normal kidney " function.    Imaging  Echocardiogram shows normal ejection fraction of 55%, normal ventricles, atrium, and valves, with a tiny bit of backflow in the tricuspid valve. Venous duplex showed no reflux or clot.      Latest Reference Range & Units 24 07:40   TSH 0.450 - 4.500 uIU/mL 5.680 (H)   Free T-4 0.82 - 1.77 ng/dL 0.92   T3 71 - 180 ng/dL 131   Thyroglob Ab 0.0 - 0.9 IU/mL 4.2 (H)   Microsomal -Tpo- Abs 0 - 34 IU/mL 76 (H)   (H): Data is abnormally high        EC-ECHOCARDIOGRAM COMPLETE W/O CONT  Order: [id number]   Status: Final result       Visible to patient: Yes (seen)       Next appt: 03/10/2025 at 09:00 AM in Physical Therapy (Briseida Wallis, PT)       Dx: Localized edema    1 Result Note       1 Patient Communication  Details    Reading Physician Reading Date Result Priority   No Reading Provider Prelim 2024    Lucy Hendrickson M.D.  [phone] 2024      Result Text  Transthoracic  Echo Report        Echocardiography Laboratory     CONCLUSIONS  Normal left ventricular systolic function. The ejection fraction is   measured to be 55% by Hannah's biplane.   Normal right ventricular size. Normal right ventricular systolic   function.  No significant valvular abnormalities.   No prior study is available for comparison.      ANDERSON RAYMUNDO  Exam Date:         2024                      12:42  Exam Location:     Out Patient  Priority:          Routine     Ordering Physician:        SERJIO LAGUNAS  Referring Physician:       [id number]  Sonographer:               Rodo Watkins RDCS     Age:    49     Gender:    F  MRN:    [id number]  :    1975  BSA:    2.05   Ht (in):    64     Wt (lb):    223  Exam Type:     Complete     Indications:     Edema  ICD Codes:       782.3     CPT Codes:       40431     BP:   120    /   82     HR:   92  Technical Quality:       Technically difficult study -                            adequate information is obtained     MEASUREMENTS  (Male / Female)  Normal Values  2D ECHO  LV Diastolic Diameter PLAX        5.3 cm                4.2 - 5.9 / 3.9 - 5.3   cm  LV Systolic Diameter PLAX         3.4 cm                2.1 - 4.0 cm  IVS Diastolic Thickness           0.8 cm                  LVPW Diastolic Thickness          0.8 cm                  LVOT Diameter                     1.8 cm                  LV Ejection Fraction MOD BP       55 %                  >= 55  %  LV Ejection Fraction MOD 4C       55.4 %                  LV Ejection Fraction MOD 2C       57.5 %                  LV Ejection Fraction 4C AL        57 %                    LV Ejection Fraction 2C AL        58.2 %                  LA Volume Index                   12.9 cm3/m2           16 - 28 cm3/m2     DOPPLER  AV Peak Velocity                  1.4 m/s                 AV Peak Gradient                  8.2 mmHg                AV Mean Gradient                  5 mmHg                  LVOT Peak Velocity                1.4 m/s                 AV Area Cont Eq vti               2.4 cm2                 Mitral E Point Velocity           0.83 m/s                Mitral E to A Ratio               0.97                    MV Pressure Half Time             65 ms                   MV Area PHT                       3.4 cm2                 MV Deceleration Time              223 ms                  PV Peak Velocity                  1.1 m/s                 PV Peak Gradient                  4.6 mmHg                RVOT Peak Velocity                0.85 m/s                LV E' Lateral Velocity            11.5 cm/s               Mitral E to LV E' Lateral Ratio   7.2                     LV E' Septal Velocity             10.1 cm/s               Mitral E to LV E' Septal Ratio    8.2                        * Indicates values subject to auto-interpretation  LV EF:        %     FINDINGS  Left Ventricle  Normal left ventricular chamber size. Normal left ventricular wall   thickness. Normal left ventricular systolic function. The  ejection   fraction is measured to be 55% by Hannah's biplane. No regional wall   motion abnormalities.     Right Ventricle  Normal right ventricular size. Normal right ventricular systolic   function.     Right Atrium  Normal right atrial size. Normal inferior vena cava size and   inspiratory collapse.     Left Atrium  Normal left atrial size. Left atrial volume index is 12mL/sq m.     Mitral Valve  Structurally normal mitral valve. No mitral stenosis. No mitral   regurgitation.     Aortic Valve  Structurally normal aortic valve. No aortic valve stenosis. No aortic   insufficiency.     Tricuspid Valve  Structurally normal tricuspid valve. No tricuspid stenosis. Trace   tricuspid regurgitation. Right atrial pressure is estimated to be 3   mmHg. Unable to estimate pulmonary artery pressure due to an inadequate   tricuspid regurgitant jet.     Pulmonic Valve  Structurally normal pulmonic valve. No pulmonic stenosis. No pulmonic   insufficiency.     Pericardium  No pericardial effusion.     Aorta  Normal aortic root for body surface area. The ascending aorta diameter   is 3.3cm.         Assessment/Plan:     Carole was seen today for weight loss and lab results.    Diagnoses and all orders for this visit:    Hypothyroidism due to Hashimoto thyroiditis  -     levothyroxine (SYNTHROID) 25 MCG Tab; Take 1 Tablet by mouth every morning on an empty stomach.  -     TSH WITH REFLEX TO FT4; Future    Lung nodule    Moderate persistent asthma with acute exacerbation in adult    Localized edema    Endometrial adenocarcinoma (HCC)    PTEN hamartoma tumor syndrome (PHTS) (HCC)    Class 2 obesity due to excess calories without serious comorbidity with body mass index (BMI) of 39.0 to 39.9 in adult        Assessment & Plan  1. Hypothyroidism.  The patient's laboratory results indicate hypothyroidism, with elevated TSH and thyroid antibodies suggesting Hashimoto's thyroiditis. Symptoms include fatigue, weight gain, and dry skin.  Levothyroxine 0.25 mcg has been prescribed and sent to [facility] in [place]. Potential side effects, including nausea, vomiting, and diarrhea, were discussed. Follow-up labs will be conducted in 6 to 8 weeks to assess the effectiveness of the medication and adjust the dose if necessary.    2. Moderate persistent asthma.  The patient reports occasional asthma flare-ups, especially during physical activity. She is advised to continue using her inhaler as needed and monitor her symptoms closely. If asthma symptoms persist or worsen, a referral to a pulmonologist may be considered.    3. Lung nodule.  The patient reports being informed of a lung nodule, which is not currently visible in the medical records. She will provide previous CT scans for comparison, and a new CT scan has been ordered to further evaluate the nodule. If necessary, a referral to the lung nodule clinic will be made during the follow-up visit.    4. Localized edema.  The patient's localized edema has not shown improvement. Potential causes and treatment options were discussed. She has an upcoming appointment with the lymphedema clinic in March 2025. Compression stockings were recommended to help manage the swelling. If symptoms persist, further evaluation may be necessary.    5. Endometrial adenocarcinoma/PTEN hamartoma tumor syndrome.  The patient will continue to follow up with oncology for ongoing management of her condition.    6. Class 2 obesity.  The patient's weight will be monitored, with the expectation that it will improve as her thyroid condition is treated. She is encouraged to maintain a healthy diet and engage in regular physical activity as tolerated.    Follow-up  The patient will follow up in 8 weeks.    Return in about 8 weeks (around 3/18/2025), or if symptoms worsen or fail to improve, for 6-8 weeks.   thyroid.    Please note that this dictation was created using voice recognition software. I have made every reasonable attempt  to correct obvious errors, but expect that there are errors of grammar and possible content that I did not discover before finalizing note.

## 2025-03-06 ENCOUNTER — APPOINTMENT (OUTPATIENT)
Dept: PHYSICAL THERAPY | Facility: REHABILITATION | Age: 50
End: 2025-03-06
Attending: PHYSICIAN ASSISTANT
Payer: COMMERCIAL

## 2025-03-10 ENCOUNTER — PHYSICAL THERAPY (OUTPATIENT)
Dept: PHYSICAL THERAPY | Facility: REHABILITATION | Age: 50
End: 2025-03-10
Attending: PHYSICIAN ASSISTANT
Payer: COMMERCIAL

## 2025-03-10 DIAGNOSIS — I89.0 LYMPHEDEMA: ICD-10-CM

## 2025-03-10 DIAGNOSIS — L90.5 SCAR: ICD-10-CM

## 2025-03-10 DIAGNOSIS — R60.0 LOCALIZED EDEMA: ICD-10-CM

## 2025-03-10 PROCEDURE — 97163 PT EVAL HIGH COMPLEX 45 MIN: CPT

## 2025-03-10 NOTE — OP THERAPY EVALUATION
"  Outpatient Physical Therapy  LYMPHEDEMA THERAPY INITIAL EVALUATION    [facility] Physical Therapy [place] [address] [facility] [facility] [address].  [address]  [place] [address]  Phone:  [phone]  Fax:  [phone]    Date of Evaluation: 03/10/2025    Patient: Carole Garcia  YOB: 1975  MRN: [id number]     Referring Provider: Janet Guerrero P.A.-C.  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 Localized edema  R60.0   2. Lymphedema  I89.0   3. Scar  L90.5       Subjective:   History of Present Illness:     Mechanism of injury:  Per chart: \"48 year old  female whose LMP was 23. She has a past medical history significant for diabetes, HTN, obesity, thyroid disease, endometriosis, and seizure disorder and a past surgical history significant for an exploratory laparoscopy/biopsy, D&C due to a miscarriage, and cholecystectomy. She presented to Dr. Bush on 23 complaining of AUB for over a year. She reported large clots, bleeding between periods, and a longer menses. She underwent a pelvic US on 23 that showed a fibroid that measured 3.31 cm. Uterus measured 10.38 x 5.22 x 5.88 cm. Left ovary measured 2.57 x 2.10 x 1.49 cm. Right ovary measured 2.84 x 2.21 x 1.98 cm. She presented to you again on 10/5/23 for a followup. She underwent an EMB on 10/5/23 which showed endometrial adenocarcinoma with extensive squamous differentiation, FIGO histologic Grade 1. She underwent a follow up ECC which showed tiny fragments of endometrial adenocarcinoma with focal squamous differentiation.    " "  Patient was recommended to undergo robotic hysterectomy with bilateral salpingo-oophorectomy with sentinel lymph node mapping.\" This surgery was completed 12/28/23.    Pt reports since her surgery her legs have hurt a lot and the legs are swollen as well. She also underwent radiation (seed) three treatments. She has had US and ECHO to rule everything out. Her thighs will swell as well as her lower legs. Her upper legs do often have pain and discomfort. She reports a month or so ago she had so much pain in her legs that she was unable to walk. Had tried muscle relaxers, but they did not work. ALso tried diuretics but that did not work either. Unsure if groin or lower abdomen. Has some OTC compression socks that are knee-high. She has developed nodules to her thyroid as well as to her lungs on most recent CT scan.       Past Medical History:   Diagnosis Date    Allergy     Anemia     ASTHMA     Bilateral hand numbness 03/28/2014    x2 months, gradually worsening Worse at night Aches, primarily radial aspect, digits 1-3 Tapping on wrist sends zingers Slight worse numbness with hyperextension    Cancer (HCC) 12/21/2023    uterine    Carbuncle     Dental disorder     partial denture    Endometriosis determined by laparoscopy     Gynecological disorder 10/5/2023    Cancer    Headache(784.0)     High cholesterol     Seasonal allergies 03/28/2014    Asking about allergy shot Discussed otc options    Snoring     URI (upper respiratory infection) 09/13/2013    x3 weeks in chest Same, not worse Sob, productive, no fever     Past Surgical History:   Procedure Laterality Date    MO INTRAOP ID SENTINEL NODE  12/28/2023    Procedure: IDENTIFICATION, LYMPH NODE, SENTINEL, INTRAOPERATIVE, USING DYE INJECTION;  Surgeon: Jesus Rodriguez M.D.;  Location: SURGERY [facility];  Service: Gyn Robotic    HYSTERECTOMY ROBOTIC XI  12/28/2023    Procedure: ROBOTIC HYSTERECTOMY, BILATERAL SALPINGO-OOPHRECTOMY;  Surgeon: Jesus Rodriguez M.D.;  " Location: SURGERY [facility];  Service: Gyn Robotic    NODE BIOPSY SENTINEL Bilateral 2023    Procedure: BIOPSY, LYMPH NODE, SENTINEL;  Surgeon: Jesus Rodriguez M.D.;  Location: SURGERY [facility];  Service: Gyn Robotic    CHOLECYSTECTOMY  1999    endometriosis    DILATION AND CURETTAGE      miscarraige    OTHER      Gallbladder, tonsils, carbuncle    TONSILLECTOMY AND ADENOIDECTOMY      as a child     Social History     Tobacco Use    Smoking status: Former     Current packs/day: 0.00     Average packs/day: 0.6 packs/day for 26.6 years (17.0 ttl pk-yrs)     Types: Cigarettes     Start date: 1998     Quit date: 2017     Years since quittin.2    Smokeless tobacco: Never   Substance Use Topics    Alcohol use: Not Currently     Alcohol/week: 1.2 oz     Types: 1 Glasses of wine, 1 Cans of beer per week     Comment: maybe once/month     Family and Occupational History     Socioeconomic History    Marital status:      Spouse name: Not on file    Number of children: Not on file    Years of education: Not on file    Highest education level: Not on file   Occupational History    Not on file       Lymphedema Objective    Left Lower Extremity Circumferential Measurements  Waist: cm  Hip: cm  Scrotum: cm  Ground/Upper Thigh: 65 cm  Mid Thigh: 49.4 cm  Knee: 37.4 cm  Upper Calf: 41 cm  Mid Calf: 29.7 cm  Ankle: 24.2 cm  Heel to Foot: 23.2 cm  Total: 269.9 cm    Right Lower Extremity Circumferential Measurements  Waist: cm  Hip: cm  Scrotum: cm  Ground/Upper Thigh: 67 cm  Mid Thigh: 52.2 cm  Knee: 38.6 cm  Upper Calf: 42.4 cm  Mid Calf: 31.7 cm  Ankle: 24.2 cm  Heel to Foot: 22.3 cm  Total: 278.4 cm          Therapeutic Exercises (CPT 83203):     1. BKFO, x30sec each, added to HEP with handout    2. bridges, x10, added to HEP with handout    Therapeutic Treatments and Modalities:     Therapeutic Treatment and Modalities Summary: Educated patient on pathogenesis of lymphedema.     Provided  education regarding Complete Decongestive Therapy (CDT). CDT is a noninvasive, multi-component approach to treat lymphedema. As there is no cure for lymphedema, the goal of treatment is to return the lymphedema to a stage of latency utilizing healthy lymph vessels and other lymphatic pathways. CDT consists of a combination of manual lymphatic drainage, compression therapy, decongestive exercises, and skin care. This will assist in maintaining the normal or near-normal size of the limb and prevent re-accumulation of lymph fluid. Additional goals of CDT include prevention and elimination of infections and reduction and removal of fibrotic tissues.      Patient was educated in regular, self MLD of bilateral lower extremity utilizing inguinal, femoral lymph nodes bilaterally with associated pathways.  Correct strokes were emphasized and handout was provided.  Patient was invited to return for in clinic, skilled physical therapist administered MLD for which patient will set an additional appointment.       Discussed the need for external compression and educated pt regarding compression garment options.  Patient was educated the optimal use of garment (all day, every day, especially during higher risk activities as discussed, remove at night).  Lymphedema risk factors were discussed.  Patient verbalized understanding to all education today. Pt reports she has compression shorts from her post-op; encouraged her to wear these and evaluate effect.    Time-based treatments/modalities:           Assessment and Plan:   Functional Impairments: lacks appropriate home exercise program, pain with function and swelling    Assessment details:  Carole is a 50yo F with hx of PTEN hamartoma tumor syndrome (PHTS) who underwent treatment for endometrial cancer in 2023 which included a hysterectomy and bilateral salpingoophorectomy with four external iliac lymph nodes removed on the right, and 2 external iliac lymph nodes on the left, in  addition to brachytherapy. She reports that shortly after her surgical intervention she began swelling inferior to her umbilicus and upper thighs became intermittently heavy with sensations of fullness. This feeling tends to come and go but she has noted that the swelling can extend to her feet. Patient has acquired secondary lymphedema stage 2 as a result of lymph node removal.  Lymphedema is characterized by high protein edema in the interstitial tissues causing damage to the lymph transport system and resulting in decreased transport capacity of the lymphatic system.  Patient has tried elevation, self OTC compression garments, diuresis and a low sodium diet, all of which were unsuccessful at treating her swelling.  Skilled lymphedema treatment is unable to be carried out by the patient and unskilled caregivers. Services will be provided by a certified lymphedema therapist.  Complete decongestive therapy is considered the gold standard of medical practice for lymphedema treatment and has proven to be effective for reduction in limb volume size and decrease risk of complications secondary to swelling (such as wounds and infections).    Patient to be seen until decongestion occurs. Physical therapy interventions to include manual lymphatic drainage, compression bandaging, skin care education, wound care if applicable, therapeutic exercises, custom garment fitting and lymphedema education to improve skin integrity, decrease lymphedema swelling, improve joint arthrokinematics and range of motion and improve quality of life.    Spontaneous recovery is not expected without skilled completed decongestive lymphedema therapy.    The patient was informed of evaluation findings and intervention plan and agrees to participate in the plan as outlined.   Prognosis: good      Goals:   Short Term Goals:  1. Pt will achieve a total limb circumference reduction of 5cm in order to decrease risk for infection and progression of  disease process.  2. Pt will be independent with self-MLD to facilitate fluid migration to healthy tissues and lymph nodes.   3. Pt will consistently perform exercises with compression on to facilitate muscle pump of fluid from affected extremity.     Short term goal time span:  2-4 weeks    Long Term Goals:  1. Pt will have a reduction in total limb circumference of 8cm in order to decrease risk for infection and progression of disease process.   2. Patient will be independent with maintenance phase management of lymphedema including: compression garments, skin care education, risk reduction strategies, manual lymphatic drainage, and therapeutic exercise.   Long term goal time span:  4-6 weeks    Plan:  Therapy options:  Physical therapy treatment to continue  Planned therapy interventions:  Caregiver education, compression bandaging, compression stocking, decongestive exercises, home exercise program, intermittent compression, manual lymph drainage, Velcro wraps, strengthening exercises, soft tissue manual techniques (CPT 74956), skin/wound care, short stretch bandages, sequential compression pump, self-care/training (CPT 58958), referral for compression garment & instructions for don/doffing, range of motion exercises, postural exercises, patient education, orthotic measurements/fitting and myofascial release techniques  Planned education:  Functional anatomy and physiology of the lymphatic system, pathophysiology of lymphedema, lymphedema exercise, lymphedema precautions, proper skin care/nutrition, compression bandaging, self massage, infection prevention, scar tissue management, activity guidelines, dietary guidelines, skin care guidelines, home pump use, bandage removal and long term self-management of lymphedema  Frequency:  2x week  Duration in weeks:  8  Discussed with:  Patient      Functional Assessment Used    LLIS    Referring provider co-signature:  I have reviewed this plan of care and my  co-signature certifies the need for services.    Certification Period: 03/10/2025 to  05/06/25    Physician Signature: ________________________________ Date: ______________

## 2025-03-11 DIAGNOSIS — C54.1 ENDOMETRIAL ADENOCARCINOMA (HCC): ICD-10-CM

## 2025-03-11 DIAGNOSIS — Q85.81: ICD-10-CM

## 2025-03-11 NOTE — PROGRESS NOTES
I was wondering if you could please create a referral for Carole to the [facility] doctor, Dr. Demetria Alfred? I think she could benefit from consultation!     Referral submitted.

## 2025-03-13 ENCOUNTER — PHYSICAL THERAPY (OUTPATIENT)
Dept: PHYSICAL THERAPY | Facility: REHABILITATION | Age: 50
End: 2025-03-13
Attending: PHYSICIAN ASSISTANT
Payer: COMMERCIAL

## 2025-03-13 DIAGNOSIS — L90.5 SCAR: ICD-10-CM

## 2025-03-13 DIAGNOSIS — I89.0 LYMPHEDEMA: ICD-10-CM

## 2025-03-13 DIAGNOSIS — R60.0 LOCALIZED EDEMA: ICD-10-CM

## 2025-03-13 PROCEDURE — 97110 THERAPEUTIC EXERCISES: CPT

## 2025-03-13 PROCEDURE — 97140 MANUAL THERAPY 1/> REGIONS: CPT

## 2025-03-13 NOTE — OP THERAPY DAILY TREATMENT
Outpatient Physical Therapy  LYMPHEDEMA THERAPY DAILY TREATMENT     [facility] Physical Therapy [address]  [address]  [place] [address]  Phone:  [phone]  Fax:  [phone]    Date: 03/13/2025    Patient: Carole Garcia  YOB: 1975  MRN: [id number]     Time Calculation    Start time: 0901  Stop time: 0945 Time Calculation (min): 44 minutes         Chief Complaint: Lymphedema Lymphedectomy    Visit #: 2    Subjective:   History of Present Illness:     Mechanism of injury:  Has increased her water intake. Reports for clarification that after her back hurt last month that her legs have not resolved ot normal          Lymphedema Objective        Therapeutic Exercises (CPT 80296):     1. BKFO, HEP    2. bridges, HEP    3. standing star pattern with pink band, x10 each bilaterally, added to HEP    Therapeutic Treatments and Modalities:     1. Manual Therapy (CPT 68981)    Therapeutic Treatment and Modalities Summary:   Manual:  -trigger point release to B hip adductors, pretibials, fibularis brevis/longus  -joint mobilization to hip into IR/ER and flexion, fibular glides, ankle DF/PF, and joint mobs at MTPs  -MLD to abdomen with stimulation of deep abdominal collectors, intercostal lymph nodes  -trigger point release to transverse abdominus, abdominal obliques, rectus abdominus, iliacus, iliopsoas  -visceral facilitation to large and small intestine.   -MLD to inguinal nodes on R and L LE followed by popliteal and surface MLD to all R LE  The purpose of Manual Lymph Drainage (MLD) is to reduce lymph volume in the affected limb by increasing intake of lymphatic load into the lymphatic system, increasing the volume of transported lymph fluid, moving lymph fluid in superficial lymph vessels to collateral lymph collectors, anastomoses, or tissue channels, and increasing venous return. The goal of MLD is to re-route the lymph flow around blocked areas into more centrally located healthy  lymph vessels, which drain into the venous system.       Time-based treatments/modalities:    Physical Therapy Timed Treatment Charges  Manual therapy minutes (CPT 74200): 34 minutes  Therapeutic exercise minutes (CPT 93361): 10 minutes      Assessment and Plan:   Assessment details:  Carole has begun wearing tight undershorts to evaluate fluid movement from her proximal thighs and inferior to umbilicus. Additionally, introduced MLD with trigger point release to assist in reduction to her B LE and lower abdomen.    Plan:  Therapy options:  Physical therapy treatment to continue  Discussed with:  Patient

## 2025-03-14 LAB — TSH SERPL DL<=0.005 MIU/L-ACNC: 2.94 UIU/ML (ref 0.45–4.5)

## 2025-03-17 ENCOUNTER — PHYSICAL THERAPY (OUTPATIENT)
Dept: PHYSICAL THERAPY | Facility: REHABILITATION | Age: 50
End: 2025-03-17
Attending: PHYSICIAN ASSISTANT
Payer: COMMERCIAL

## 2025-03-17 ENCOUNTER — RESULTS FOLLOW-UP (OUTPATIENT)
Dept: MEDICAL GROUP | Facility: CLINIC | Age: 50
End: 2025-03-17
Payer: COMMERCIAL

## 2025-03-17 DIAGNOSIS — L90.5 SCAR: ICD-10-CM

## 2025-03-17 DIAGNOSIS — I89.0 LYMPHEDEMA: ICD-10-CM

## 2025-03-17 PROCEDURE — 97140 MANUAL THERAPY 1/> REGIONS: CPT

## 2025-03-17 PROCEDURE — 97535 SELF CARE MNGMENT TRAINING: CPT

## 2025-03-17 NOTE — OP THERAPY DAILY TREATMENT
Outpatient Physical Therapy  LYMPHEDEMA THERAPY DAILY TREATMENT     [facility] Physical Therapy [address]  [address]  [place] [address]  Phone:  [phone]  Fax:  [phone]    Date: 03/17/2025    Patient: Carole Garcia  YOB: 1975  MRN: [id number]     Time Calculation    Start time: 0910  Stop time: 0950 Time Calculation (min): 40 minutes         Chief Complaint: Lymphedema Aquired    Visit #: 3    Subjective:   History of Present Illness:     Mechanism of injury:  Legs still feel very tired. A little dehydrated too. Able to wear her spandex shorts daily.      Lymphedema Objective        Therapeutic Exercises (CPT 33530):     1. BKFO, HEP    2. bridges, HEP    3. standing star pattern with pink band, HEP    Therapeutic Treatments and Modalities:     1. Self Care ADL Training (CPT 03965), Discussed various compression options. Carole would like to order more compression shorts. Introduced her to Bioflect legging which could assist with improving fluid movement throughout her legs and abdomen.    2. Manual Therapy (CPT 56591)    Therapeutic Treatment and Modalities Summary:   Manual:  -trigger point release to B hip adductors, pretibials, fibularis brevis/longus  -joint mobilization to hip into IR/ER and flexion, fibular glides, ankle DF/PF, and joint mobs at MTPs  -MLD to abdomen with stimulation of deep abdominal collectors, intercostal lymph nodes  -trigger point release to transverse abdominus, abdominal obliques, rectus abdominus, iliacus, iliopsoas  -visceral facilitation to large and small intestine.   -MLD to inguinal nodes on R and L LE followed by popliteal and surface MLD to all R LE  The purpose of Manual Lymph Drainage (MLD) is to reduce lymph volume in the affected limb by increasing intake of lymphatic load into the lymphatic system, increasing the volume of transported lymph fluid, moving lymph fluid in superficial lymph vessels to collateral lymph collectors,  anastomoses, or tissue channels, and increasing venous return. The goal of MLD is to re-route the lymph flow around blocked areas into more centrally located healthy lymph vessels, which drain into the venous system.       Time-based treatments/modalities:    Physical Therapy Timed Treatment Charges  Functional training, self care minutes (CPT 68222): 11 minutes  Manual therapy minutes (CPT 99661): 29 minutes      Assessment and Plan:   Assessment details:  Carole reports having improved B LE total swelling but does still have some pitting to her distal B LE. She will benefit from further intervention to decongest her B LE.     Plan:  Therapy options:  Physical therapy treatment to continue  Discussed with:  Patient

## 2025-03-17 NOTE — Clinical Note
REFERRAL APPROVAL NOTICE         Sent on March 17, 2025                   Carole Garcia  [address] [facility] [address]                   Dear Ms. Garcia,    After a careful review of the medical information and benefit coverage, [facility] has processed your referral. See below for additional details.    If applicable, you must be actively enrolled with your insurance for coverage of the authorized service. If you have any questions regarding your coverage, please contact your insurance directly.    REFERRAL INFORMATION   Referral #:  [id number]  Referred-To Department    Referred-By Provider:  Hematology Oncology    Janet Guerrero P.A.-C.   [facility]      [address]  [facility] [address]  [phone] [address] [facility] [address]  [facility] [id number] [phone]    Referral Start Date:  03/11/2025  Referral End Date:   03/11/2026           SCHEDULING  If you do not already have an appointment, please call [phone] to make an appointment.   MORE INFORMATION  As a reminder, [facility] ownership has changed, meaning this location is now owned and operated by [facility]. As such, we want to clarify that our patients should expect to receive two separate bills for the services received at [facility] - one representing the [facility] facility fees as the owner of the establishment, and the other to represent the physician's services and subsequent fees. You can speak with your insurance carrier for a pricing estimate by calling the customer service number on the back of your card and ask about charges for a hospital outpatient visit.  If you do not already have a [employer] account, sign up at: [email or web address]  You can access your medical information, make appointments, see lab results, billing information, and more.  If you have questions regarding this referral, please contact  the [facility] Referrals department at:              [phone]. Monday - Friday 7:30AM - 5:00PM.      Sincerely,  [facility]

## 2025-03-19 ENCOUNTER — OFFICE VISIT (OUTPATIENT)
Dept: MEDICAL GROUP | Facility: CLINIC | Age: 50
End: 2025-03-19
Payer: COMMERCIAL

## 2025-03-19 VITALS
HEIGHT: 64 IN | BODY MASS INDEX: 40.01 KG/M2 | OXYGEN SATURATION: 97 % | WEIGHT: 234.35 LBS | RESPIRATION RATE: 16 BRPM | TEMPERATURE: 98.5 F | SYSTOLIC BLOOD PRESSURE: 124 MMHG | HEART RATE: 82 BPM | DIASTOLIC BLOOD PRESSURE: 64 MMHG

## 2025-03-19 DIAGNOSIS — Q85.81: ICD-10-CM

## 2025-03-19 DIAGNOSIS — E78.5 DYSLIPIDEMIA: ICD-10-CM

## 2025-03-19 DIAGNOSIS — E55.9 VITAMIN D DEFICIENCY: ICD-10-CM

## 2025-03-19 DIAGNOSIS — E06.3 HYPOTHYROIDISM DUE TO HASHIMOTO THYROIDITIS: ICD-10-CM

## 2025-03-19 DIAGNOSIS — R73.03 PREDIABETES: ICD-10-CM

## 2025-03-19 DIAGNOSIS — E66.01 CLASS 3 SEVERE OBESITY DUE TO EXCESS CALORIES WITH SERIOUS COMORBIDITY AND BODY MASS INDEX (BMI) OF 40.0 TO 44.9 IN ADULT (HCC): ICD-10-CM

## 2025-03-19 DIAGNOSIS — E66.813 CLASS 3 SEVERE OBESITY DUE TO EXCESS CALORIES WITH SERIOUS COMORBIDITY AND BODY MASS INDEX (BMI) OF 40.0 TO 44.9 IN ADULT (HCC): ICD-10-CM

## 2025-03-19 PROCEDURE — 3074F SYST BP LT 130 MM HG: CPT | Performed by: PHYSICIAN ASSISTANT

## 2025-03-19 PROCEDURE — 99214 OFFICE O/P EST MOD 30 MIN: CPT | Performed by: PHYSICIAN ASSISTANT

## 2025-03-19 PROCEDURE — 3078F DIAST BP <80 MM HG: CPT | Performed by: PHYSICIAN ASSISTANT

## 2025-03-19 ASSESSMENT — FIBROSIS 4 INDEX: FIB4 SCORE: 0.58

## 2025-03-19 NOTE — PROGRESS NOTES
cc:  thyroid    Subjective:     Carole Garcia is a 49 y.o. female presenting for thyroid        History of Present Illness  The patient is a 49-year-old female who presents to the office today for thyroid, lymphedema, PTEN hamartoma tumor, prediabetes, vitamin D deficiency, dyslipidemia, and obesity.    She reports no significant changes in her condition or any new symptoms related to her thyroid. She has not experienced increased hair loss or tachycardia.    She has been diagnosed with stage 2 lymphedema and has attended several physical therapy sessions on 17th, 19th, Monday, and tomorrow will be the fifth appointment. She reports some improvement in her condition but continues to experience pitting edema. She is scheduled for biweekly appointments over a period of 5 to 6 weeks. She has been advised to wear compression shorts and pants to aid in circulation. She recalls a recent episode where she was unable to bear weight on her legs, resulting in bed rest for 3 days. She has been performing massages and exercises with a band as part of her treatment regimen. She has also been advised to increase her water intake and protein consumption and to monitor her diet for potential triggers.    She has a PTEN hamartoma tumor and has been referred to a women's wellness oncologist by her physical therapist. She is scheduled for an appointment next Tuesday. She has brought her blood results from her oncologist for review. She is scheduled to see Dr. Downing next month in April 2025.    She was previously diagnosed as prediabetic following her cancer surgery, which raised concerns. Her A1c level was recorded as 4.9 last month.    She is currently seeking a new multivitamin supplement due to her advancing age.    She has decided against the use of Mounjaro for weight loss and is seeking alternative suggestions.       Review of systems:  See above.   Denies any symptoms unless previously indicated.        Current Outpatient  Medications:     levothyroxine (SYNTHROID) 25 MCG Tab, Take 1 Tablet by mouth every morning on an empty stomach., Disp: 90 Tablet, Rfl: 1    omeprazole (PRILOSEC) 40 MG delayed-release capsule, TAKE 1 CAPSULE BY MOUTH DAILY, Disp: 90 Capsule, Rfl: 2    Fezolinetant 45 MG Tab, Take 45 mg by mouth every day., Disp: , Rfl:     albuterol (PROVENTIL HFA) 108 (90 Base) MCG/ACT Aero Soln inhalation aerosol, Inhale 2 Puffs every four hours as needed for Shortness of Breath., Disp: 54 g, Rfl: 2    budesonide-formoterol (SYMBICORT) 80-4.5 MCG/ACT Aerosol, Inhale 1 Puff., Disp: , Rfl:     Multiple Vitamins-Minerals (SYSTANE ICAPS AREDS2 PO), Take 2 Capsules by mouth every day., Disp: , Rfl:     acetaminophen (TYLENOL) 500 MG Tab, Take 1,000 mg by mouth 1 time a day as needed for Moderate Pain., Disp: , Rfl:     ibuprofen (MOTRIN) 200 MG Tab, Take 400 mg by mouth 2 times a day as needed for Mild Pain., Disp: , Rfl:     vitamin D3 (CHOLECALCIFEROL) 1000 Unit (25 mcg) Tab, Take 2,000 Units by mouth every day., Disp: , Rfl:     fluticasone furoate (ARNUITY ELLIPTA) 100 MCG/ACT AEROSOL POWDER, BREATH ACTIVATED inhaler, Inhale 1 Puff every day. (Patient not taking: Reported on 1/21/2025), Disp: 30 Each, Rfl: 2    Tirzepatide (MOUNJARO) 5 MG/0.5ML Solution Pen-injector, Inject 2.5 mg under the skin every 7 days. (Patient not taking: Reported on 3/19/2025), Disp: 9 mL, Rfl: 2    DULoxetine (CYMBALTA) 30 MG Cap DR Particles, Take 1 Capsule by mouth every morning. (Patient not taking: Reported on 4/14/2024), Disp: , Rfl:     estradiol (ESTRACE) 0.1 MG/GM vaginal cream, , Disp: , Rfl:     methylPREDNISolone (MEDROL DOSEPAK) 4 MG Tablet Therapy Pack, Follow schedule on package instructions. (Patient not taking: Reported on 10/30/2024), Disp: 21 Tablet, Rfl: 0    budesonide-formoterol (SYMBICORT) 80-4.5 MCG/ACT Aerosol, Inhale 1 Puff 2 times a day. (Patient not taking: Reported on 10/30/2024), Disp: 1 Each, Rfl: 5    sennosides (SENOKOT)  "8.6 MG Tab, Take 1 Tablet by mouth every day. (Patient not taking: Reported on 10/30/2024), Disp: 30 Tablet, Rfl: 0    Allergies, past medical history, past surgical history, family history, social history reviewed and updated    Objective:     Vitals: /64 (BP Location: Left arm, Patient Position: Sitting, BP Cuff Size: Large adult)   Pulse 82   Temp 36.9 °C (98.5 °F) (Temporal)   Resp 16   Ht 1.626 m (5' 4\")   Wt 106 kg (234 lb 5.6 oz)   LMP 12/02/2023   SpO2 97%   BMI 40.23 kg/m²   General: Alert, pleasant, NAD  EYES:   PERRL, EOMI, no icterus or pallor.  Conjunctivae and lids normal.   HENT:  Normocephalic.  External ears normal. Neck supple.   Respiratory: Normal respiratory effort.   Abdomen: obese  Skin: Warm, dry, no rashes.  Musculoskeletal: Gait is normal.  Moves all extremities well.    Extremities: normal range of motion all extremities.   Neurological: No tremors, sensation grossly intact,  CN2-12 intact.  Psych:  Affect/mood is normal, judgement is good, memory is intact, grooming is appropriate.    Results  Laboratory Studies  Thyroid test was normal with a TSH level of 2.94. Blood sugar is normal at 84. Liver and kidney function tests are normal. Red and white blood cell counts are normal. Hemoglobin and hematocrit levels are normal. Calcium levels are normal.       Assessment/Plan:     There are no diagnoses linked to this encounter.    Assessment & Plan  1. PTEN hamartoma tumor.  The condition is currently stable. She will continue to follow up with her specialist. An upcoming appointment with oncology wellness is scheduled, which is expected to be beneficial.    2. Lymphedema.  She is responding well to physical therapy for lymphedema. She will continue with the current physical therapy regimen, which includes wearing compression shorts and attending bi-weekly sessions for 5 to 6 weeks. She is also keeping a journal to identify if specific foods trigger an increase in " lymphedema.    3. Hypothyroidism.  The condition is stable. Continue to monitor by repeating labs in 6 months to ensure the dose remains appropriate.    4. Prediabetes.  Labs are stable. An A1c test will be added to the upcoming labs to monitor her blood sugar levels.    5. Vitamin D deficiency.  Labs are stable. Repeat labs in 6 months to monitor vitamin D levels.    6. Dyslipidemia.  Labs are stable. Repeat labs in 6 months to monitor cholesterol levels.    7. Class III obesity.  Options were discussed. Medications such as Wegovy and Mounjaro are not covered at this time. The possibility of orlistat/Iker was discussed. She is also keeping a journal for lymphedema to determine if specific foods will trigger an increase in lymphedema, which may contribute to improved weight management.    Follow-up  The patient will follow up in 6 months, sooner if needed.    No follow-ups on file.    Please note that this dictation was created using voice recognition software. I have made every reasonable attempt to correct obvious errors, but expect that there are errors of grammar and possible content that I did not discover before finalizing note.

## 2025-03-20 ENCOUNTER — PHYSICAL THERAPY (OUTPATIENT)
Dept: PHYSICAL THERAPY | Facility: REHABILITATION | Age: 50
End: 2025-03-20
Attending: PHYSICIAN ASSISTANT
Payer: COMMERCIAL

## 2025-03-20 DIAGNOSIS — I89.0 LYMPHEDEMA: ICD-10-CM

## 2025-03-20 DIAGNOSIS — L90.5 SCAR: ICD-10-CM

## 2025-03-20 PROCEDURE — 97140 MANUAL THERAPY 1/> REGIONS: CPT

## 2025-03-20 NOTE — OP THERAPY DAILY TREATMENT
Outpatient Physical Therapy  LYMPHEDEMA THERAPY DAILY TREATMENT     [facility] [address].  [address]  [place] [address]  Phone:  [phone]  Fax:  [phone]    Date: 03/20/2025    Patient: Carole Garcia  YOB: 1975  MRN: [id number]     Time Calculation    Start time: 0900  Stop time: 0946 Time Calculation (min): 46 minutes         Chief Complaint: Lymphedema Aquired    Visit #: 4    Subjective:   History of Present Illness:     Mechanism of injury:  Legs are very tired today. Switched from Tylenol to Aleve. But legs not as achy.       Lymphedema Objective    Left Lower Extremity Circumferential Measurements  Waist: cm  Hip: cm  Scrotum: cm  Ground/Upper Thigh: 63.1 cm  Mid Thigh: 48.2 cm  Knee: 35.1 cm  Upper Calf: 39.3 cm  Mid Calf: 29.3 cm  Ankle: 24.5 cm  Heel to Foot: 22.1 cm  Total: 261.6 cm    Right Lower Extremity Circumferential Measurements  Waist: cm  Hip: cm  Scrotum: cm  Ground/Upper Thigh: 63.2 cm  Mid Thigh: 50.2 cm  Knee: 37.4 cm  Upper Calf: 41.2 cm  Mid Calf: 30.8 cm  Ankle: 24.1 cm  Heel to Foot: 22.7 cm  Total: 269.6 cm        Left Lower Extremity Circumferential Measurements EVAL  Waist: cm  Hip: cm  Scrotum: cm  Ground/Upper Thigh: 65 cm  Mid Thigh: 49.4 cm  Knee: 37.4 cm  Upper Calf: 41 cm  Mid Calf: 29.7 cm  Ankle: 24.2 cm  Heel to Foot: 23.2 cm  Total: 269.9 cm     Right Lower Extremity Circumferential Measurements EVAL  Waist: cm  Hip: cm  Scrotum: cm  Ground/Upper Thigh: 67 cm  Mid Thigh: 52.2 cm  Knee: 38.6 cm  Upper Calf: 42.4 cm  Mid Calf: 31.7 cm  Ankle: 24.2 cm  Heel to Foot: 22.3 cm  Total: 278.4 cm    Therapeutic Exercises (CPT 85717):     1. BKFO, HEP    2. bridges, HEP    3. standing star pattern with pink band, HEP    4. lunge stretch against wall, x5 each, added to HEP    Therapeutic Treatments and Modalities:     1. Manual Therapy (CPT 78579)    Therapeutic Treatment and Modalities Summary:   Manual:  -trigger point  release to B hip adductors, pretibials, fibularis brevis/longus  -joint mobilization to hip into IR/ER and flexion, fibular glides, ankle DF/PF, and joint mobs at MTPs  -MLD to abdomen with stimulation of deep abdominal collectors, intercostal lymph nodes  -trigger point release to transverse abdominus, abdominal obliques, rectus abdominus, iliacus, iliopsoas  -visceral facilitation to large and small intestine.   -MLD to inguinal nodes on R and L LE followed by popliteal and surface MLD to all R LE  The purpose of Manual Lymph Drainage (MLD) is to reduce lymph volume in the affected limb by increasing intake of lymphatic load into the lymphatic system, increasing the volume of transported lymph fluid, moving lymph fluid in superficial lymph vessels to collateral lymph collectors, anastomoses, or tissue channels, and increasing venous return. The goal of MLD is to re-route the lymph flow around blocked areas into more centrally located healthy lymph vessels, which drain into the venous system.       Time-based treatments/modalities:    Physical Therapy Timed Treatment Charges  Manual therapy minutes (CPT 96444): 46 minutes      Assessment and Plan:   Assessment details:  Craole has reduced her B LE by 8-9cm each. She is continuing to wear her compression shorts during the day and perform self-MLD and HEP. She will benefit from further intervention to decongest her B LE and lower abdomen.     Plan:  Therapy options:  Physical therapy treatment to continue  Discussed with:  Patient

## 2025-03-24 ENCOUNTER — PHYSICAL THERAPY (OUTPATIENT)
Dept: PHYSICAL THERAPY | Facility: REHABILITATION | Age: 50
End: 2025-03-24
Attending: PHYSICIAN ASSISTANT
Payer: COMMERCIAL

## 2025-03-24 DIAGNOSIS — L90.5 SCAR: ICD-10-CM

## 2025-03-24 DIAGNOSIS — I89.0 LYMPHEDEMA: ICD-10-CM

## 2025-03-24 PROCEDURE — 97140 MANUAL THERAPY 1/> REGIONS: CPT

## 2025-03-24 NOTE — OP THERAPY DAILY TREATMENT
Outpatient Physical Therapy  LYMPHEDEMA THERAPY DAILY TREATMENT     [facility] [address].  [address]  [place] [address]  Phone:  [phone]  Fax:  [phone]    Date: 03/24/2025    Patient: Carole Garcia  YOB: 1975  MRN: [id number]     Time Calculation    Start time: 1030  Stop time: 1119 Time Calculation (min): 49 minutes         Chief Complaint: Lymphedema Lymphedectomy    Visit #: 5    Subjective:   History of Present Illness:     Mechanism of injury:  Took a break from compression and noted a difference.       Lymphedema Objective        Therapeutic Exercises (CPT 53573):     1. BKFO, HEP    2. bridges, HEP    3. standing star pattern with pink band, HEP    4. lunge stretch against wall, HEP    Therapeutic Treatments and Modalities:     1. Manual Therapy (CPT 82439)    Therapeutic Treatment and Modalities Summary:   Manual:  -trigger point release to B hip adductors, pretibials, fibularis brevis/longus  -joint mobilization to hip into IR/ER and flexion, fibular glides, ankle DF/PF, and joint mobs at MTPs  -MLD to abdomen with stimulation of deep abdominal collectors, intercostal lymph nodes  -trigger point release to transverse abdominus, abdominal obliques, rectus abdominus, iliacus, iliopsoas  -visceral facilitation to large and small intestine.   -MLD to inguinal nodes on R and L LE followed by popliteal and surface MLD to all R LE  The purpose of Manual Lymph Drainage (MLD) is to reduce lymph volume in the affected limb by increasing intake of lymphatic load into the lymphatic system, increasing the volume of transported lymph fluid, moving lymph fluid in superficial lymph vessels to collateral lymph collectors, anastomoses, or tissue channels, and increasing venous return. The goal of MLD is to re-route the lymph flow around blocked areas into more centrally located healthy lymph vessels, which drain into the venous system.       Briefly discussed  various compression types; does include shapewear and certainly shapewear can be utilized for mild compression.     Time-based treatments/modalities:    Physical Therapy Timed Treatment Charges  Manual therapy minutes (CPT 78963): 49 minutes      Assessment and Plan:   Assessment details:  Carole is consistently wearing her compression after a brief trial of a break. She is able to feel onset as well as visualize. She will benefit from further intervention to achieve decongestion.     Plan:  Therapy options:  Physical therapy treatment to continue  Discussed with:  Patient

## 2025-03-24 NOTE — PROGRESS NOTES
"          Primary Care Provider Janet Guerrero P.A.-C.   Referring Provider: Janet VEE / Briseida Wallis PT  Surgical Oncologist: Dr Jesus Rodriguez   Medical Oncologist: Raghav Downing MD    Radiation Oncologist: Manan Jackson MD      HPI:  49 y.o. 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 HR clinic referral     This very delightful patient presents today after referral from her physical therapist Briseida Wallis struggling to regain her life after the cancer diagnosis.  She notes that her lower extremity lymphedema to be her main limiting factor.  She is very frustrated because she felt like it took a long time to be diagnosed but does state that it seems to slowly be getting better possibly.  But she is very frustrated because she cannot be active because of the edema as well as constant pain in her legs.    Because of the inactivity due to the leg pain she has had approximately 50 pound weight gain since diagnosis.  She over the past year has been really working on a Mediterranean diet.  She does meal planning and tries to get about 1200 ja a day.  She has been doing this since last April states that she might lose a few pounds but then gained some right back.  She has cut out processed food and fast foods she is switched from sugar containing beverages to things like Coke 0.  She states otherwise she has not gone \"too " "hard\" but has made lifestyle changes.  She states her primary care looked into coverage of a GLP-1 receptor agonist and her insurance will not cover.  She had considered compounded medications but her primary care advised against it.  She is also wanting something to just \"jumpstart her weight loss and is not looking for something that she has to be on the rest of her life.    She also has some sexual health concerns states that her libido is actually quite good and as well as she has normal arousal and orgasmic function.  But feels that there is a length barrier with the vagina and her partner just \"does not fit\" anymore she was using an estradiol cream but that was just while things healed after surgery.  She was then taken off of it she does use lubrication's and states that things just feel uncomfortable.    She is tearful and anxious and frustrated as I mentioned just because she wishes to do more, and be more active and really wants to return to her normal life but is very limited from the pain in her legs.    Carole Garcia has a current medication list which includes the following prescription(s): levothyroxine, omeprazole, fezolinetant, albuterol, duloxetine, multiple vitamins-minerals, acetaminophen, ibuprofen, vitamin d3, estradiol, and budesonide-formoterol.     Patient Active Problem List   Diagnosis    Asthma in adult    Obesity    Tobacco abuse    Seasonal allergies    Bilateral hand numbness    Iron deficiency anemia    Vitamin D deficiency    Dyslipidemia    Endometrial adenocarcinoma (HCC)    Prediabetes    Class 2 obesity with body mass index (BMI) of 39.0 to 39.9 in adult    Macular degeneration, wet (HCC)    Family history of clotting disorder    Dyspepsia    PTEN hamartoma tumor syndrome (PHTS) (HCC)    Thyroid nodule    Localized edema    Hypothyroidism due to Hashimoto thyroiditis    Lung nodule        Past Surgical History:   Procedure Laterality Date    ID INTRAOP ID SENTINEL NODE  " 2023    Procedure: IDENTIFICATION, LYMPH NODE, SENTINEL, INTRAOPERATIVE, USING DYE INJECTION;  Surgeon: Jesus Rodriguez M.D.;  Location: SURGERY [facility];  Service: Gyn Robotic    HYSTERECTOMY ROBOTIC XI  2023    Procedure: ROBOTIC HYSTERECTOMY, BILATERAL SALPINGO-OOPHRECTOMY;  Surgeon: Jesus Rodriguez M.D.;  Location: SURGERY [facility];  Service: Gyn Robotic    NODE BIOPSY SENTINEL Bilateral 2023    Procedure: BIOPSY, LYMPH NODE, SENTINEL;  Surgeon: Jesus Rodriguez M.D.;  Location: SURGERY [facility];  Service: Gyn Robotic    CHOLECYSTECTOMY  1999    endometriosis    DILATION AND CURETTAGE      miscarraige    OTHER      Gallbladder, tonsils, carbuncle    TONSILLECTOMY AND ADENOIDECTOMY      as a child        Social History     Tobacco Use    Smoking status: Former     Current packs/day: 0.00     Average packs/day: 0.6 packs/day for 26.6 years (17.0 ttl pk-yrs)     Types: Cigarettes     Start date: 1998     Quit date: 2017     Years since quittin.2    Smokeless tobacco: Never   Vaping Use    Vaping status: Never Used   Substance Use Topics    Alcohol use: Not Currently     Alcohol/week: 1.2 oz     Types: 1 Glasses of wine, 1 Cans of beer per week     Comment: maybe once/month    Drug use: Not Currently     Types: Oral     Comment: I have taken THC Gummies to help with sleep but not current        Family History   Problem Relation Age of Onset    Diabetes Mother     Heart Disease Mother     Hypertension Mother     Hyperlipidemia Mother     DVT Mother     Cancer Father 57        colon    Genetic Disorder Brother         thyroid    Diabetes Maternal Aunt     Heart Disease Maternal Aunt     Hypertension Maternal Aunt     Hyperlipidemia Maternal Aunt     Diabetes Maternal Grandmother     Heart Disease Maternal Grandmother     Hypertension Maternal Grandmother     Hyperlipidemia Maternal Grandmother     Alcohol/Drug Maternal Grandfather         Pancreatits        Carole Oropeza Garcia is  allergic to advair diskus.     Vitals:    03/25/25 1053   BP: (!) 154/111   Pulse: 93   Temp: 36.8 °C (98.3 °F)   SpO2: 96%     Body mass index is 39.65 kg/m².    Physical Exam  Vitals reviewed.   Constitutional:       Appearance: Normal appearance.   Skin:     General: Skin is warm.   Neurological:      General: No focal deficit present.      Mental Status: She is alert and oriented to person, place, and time.   Psychiatric:         Mood and Affect: Mood normal.         Behavior: Behavior normal.         Thought Content: Thought content normal.          1. PTEN hamartoma tumor syndrome (PHTS) (HCC)    2. Endometrial adenocarcinoma (HCC)    3. Class 2 obesity due to excess calories without serious comorbidity with body mass index (BMI) of 39.0 to 39.9 in adult    4. Vasomotor symptoms due to menopause    5. Genitourinary syndrome of menopause       We reviewed the goal of the oncology wellness clinic is to assist her in optimizing health, sense of well-being, reducing recurrence risk, and improving quality of life after a cancer diagnosis. In the situation of those without a cancer diagnosis but at high risk, our focus is on lifestyle focused prevention strategies. We identified the following areas which are currently identified as priorities to work on by this patient:     achieving a healthier weight , mental and emotional health , and sexual health and intimacy    Our current plan includes:   We discussed a wide variety of lifestyle factors and the importance they play in cancer survivorship, not only to optimize short and long term health, but to reduce the risk of recurrence, and improve quality of life in all stages of the disease. , I reviewed the lifestyle data specific to breast cancer recurrence risk and adherence to lifestyle recommendations from: John RA, Marianne KM, Xavier EW, et al. Adherence to Cancer Prevention Lifestyle Recommendations Before, During, and 2 Years After Treatment for High-risk  Breast Cancer. MOOK Netw Open. 2023;6(5):g4210754. doi:10.1001/jamanetworkopen.2023.97082 and the specifics of these recommendations were reviewed. , Patient was given my packet of lifestyle resources, including nutrition and physical activity information , The benefits of a more plant-forward diet, focused on a wide variety of fruits, vegetables, legumes, nuts, seeds, and lean proteins was explained. , The patient and I discussed a goal of incorporating more fiber as well as diversity, slowly increasing to 25-30 grams a day. There is also benefit to a wide diversity of fiber sources. , The patient will work toward 5-8 servings of fruits/ vegetables per day, which helps also meet fiber goals. , The patient will try to minimize added sugar to less than 25 grams / day. , and A hydration goal was set of oz = body weight / 2.    Sexual Health Plan:   Discussed vaginal moisturizers. their regular use, and variety of brands. Samples given., Discussed the importance of liberal use of lubricants with sexual activity and acts of intimacy, reviewed water, oil, and silicone based options. Samples given. , We discussed the use of Pelvic Floor PT and a referral was made., We discussed the use of vaginal dilators., and An OhNut device was recommended to help buffer and control penetration depth.    Discussed detailed instructions on how to use vaginal dilators.    Total time spent today, including personal review of past history, face to face time, chart documentation, and  was 77  minutes.

## 2025-03-25 ENCOUNTER — OFFICE VISIT (OUTPATIENT)
Dept: SURGERY | Facility: MEDICAL CENTER | Age: 50
End: 2025-03-25
Payer: COMMERCIAL

## 2025-03-25 VITALS
HEART RATE: 93 BPM | BODY MASS INDEX: 39.44 KG/M2 | WEIGHT: 231 LBS | DIASTOLIC BLOOD PRESSURE: 111 MMHG | OXYGEN SATURATION: 96 % | TEMPERATURE: 98.3 F | HEIGHT: 64 IN | SYSTOLIC BLOOD PRESSURE: 154 MMHG

## 2025-03-25 DIAGNOSIS — Q85.81: ICD-10-CM

## 2025-03-25 DIAGNOSIS — N95.8 GENITOURINARY SYNDROME OF MENOPAUSE: ICD-10-CM

## 2025-03-25 DIAGNOSIS — N95.1 VASOMOTOR SYMPTOMS DUE TO MENOPAUSE: ICD-10-CM

## 2025-03-25 DIAGNOSIS — E66.09 CLASS 2 OBESITY DUE TO EXCESS CALORIES WITHOUT SERIOUS COMORBIDITY WITH BODY MASS INDEX (BMI) OF 39.0 TO 39.9 IN ADULT: ICD-10-CM

## 2025-03-25 DIAGNOSIS — E66.812 CLASS 2 OBESITY DUE TO EXCESS CALORIES WITHOUT SERIOUS COMORBIDITY WITH BODY MASS INDEX (BMI) OF 39.0 TO 39.9 IN ADULT: ICD-10-CM

## 2025-03-25 DIAGNOSIS — N94.19 DYSPAREUNIA DUE TO MEDICAL CONDITION IN FEMALE: ICD-10-CM

## 2025-03-25 DIAGNOSIS — C54.1 ENDOMETRIAL ADENOCARCINOMA (HCC): ICD-10-CM

## 2025-03-25 PROCEDURE — 3080F DIAST BP >= 90 MM HG: CPT | Performed by: OBSTETRICS & GYNECOLOGY

## 2025-03-25 PROCEDURE — 3077F SYST BP >= 140 MM HG: CPT | Performed by: OBSTETRICS & GYNECOLOGY

## 2025-03-25 PROCEDURE — 99417 PROLNG OP E/M EACH 15 MIN: CPT | Performed by: OBSTETRICS & GYNECOLOGY

## 2025-03-25 PROCEDURE — 99205 OFFICE O/P NEW HI 60 MIN: CPT | Performed by: OBSTETRICS & GYNECOLOGY

## 2025-03-25 RX ORDER — METFORMIN HYDROCHLORIDE 500 MG/1
TABLET, EXTENDED RELEASE ORAL
Qty: 132 TABLET | Refills: 0 | Status: SHIPPED | OUTPATIENT
Start: 2025-03-25 | End: 2025-05-22

## 2025-03-25 ASSESSMENT — FIBROSIS 4 INDEX: FIB4 SCORE: 0.58

## 2025-03-26 NOTE — Clinical Note
REFERRAL APPROVAL NOTICE         Sent on March 26, 2025                   Carole Garcia  [address] [facility] [address]                   Dear Ms. Garcia,    After a careful review of the medical information and benefit coverage, [facility] has processed your referral. See below for additional details.    If applicable, you must be actively enrolled with your insurance for coverage of the authorized service. If you have any questions regarding your coverage, please contact your insurance directly.    REFERRAL INFORMATION   Referral #:  [id number]  Referred-To Department    Referred-By Provider:  Occupational Therapy    Demetria Alfred M.D.   Occup Therapy [facility]      [address]  [address]  [facility] [address]  [phone] [address]  [facility] [id number] [phone]    Referral Start Date:  03/25/2025  Referral End Date:   03/25/2026             SCHEDULING  If you do not already have an appointment, please call [phone] to make an appointment.     MORE INFORMATION  If you do not already have a [employer] account, sign up at: [email or web address]  You can access your medical information, make appointments, see lab results, billing information, and more.  If you have questions regarding this referral, please contact  the [facility] Referrals department at:             [phone]. Monday - Friday 8:00AM - 5:00PM.     Sincerely,    [facility]

## 2025-03-27 ENCOUNTER — PHYSICAL THERAPY (OUTPATIENT)
Dept: PHYSICAL THERAPY | Facility: REHABILITATION | Age: 50
End: 2025-03-27
Attending: PHYSICIAN ASSISTANT
Payer: COMMERCIAL

## 2025-03-27 DIAGNOSIS — L90.5 SCAR: ICD-10-CM

## 2025-03-27 DIAGNOSIS — R60.0 LOCALIZED EDEMA: ICD-10-CM

## 2025-03-27 DIAGNOSIS — I89.0 LYMPHEDEMA: ICD-10-CM

## 2025-03-27 PROCEDURE — 97110 THERAPEUTIC EXERCISES: CPT

## 2025-03-27 PROCEDURE — 97140 MANUAL THERAPY 1/> REGIONS: CPT

## 2025-03-27 NOTE — OP THERAPY DAILY TREATMENT
Outpatient Physical Therapy  LYMPHEDEMA THERAPY DAILY TREATMENT     [facility] Physical Therapy [address]  [address]  [place] [address]  Phone:  [phone]  Fax:  [phone]    Date: 03/27/2025    Patient: Carole Garcia  YOB: 1975  MRN: [id number]     Time Calculation    Start time: 0905  Stop time: 0946 Time Calculation (min): 41 minutes         Chief Complaint: Lymphedema Aquired    Visit #: 6    Subjective:   History of Present Illness:     Mechanism of injury:  Yesterday, went on a walk and now her R hip is sore and her leg is a little tired and achy.       Lymphedema Objective        Therapeutic Exercises (CPT 78772):     1. BKFO, x5, HEP    2. bridges, HEP    3. standing star pattern with pink band, HEP    4. lunge stretch against wall, HEP    5. sciatic nerve glide, B x10 each, added to HEP    Therapeutic Treatments and Modalities:     1. Manual Therapy (CPT 06244)    Therapeutic Treatment and Modalities Summary:   Manual:  -trigger point release to B hip adductors, pretibials, fibularis brevis/longus  -joint mobilization to hip into IR/ER and flexion, fibular glides, ankle DF/PF, and joint mobs at MTPs  -MLD to abdomen with stimulation of deep abdominal collectors, intercostal lymph nodes  -trigger point release to transverse abdominus, abdominal obliques, rectus abdominus, iliacus, iliopsoas  -visceral facilitation to large and small intestine.   -MLD to inguinal nodes on R and L LE followed by popliteal and surface MLD to all R LE  The purpose of Manual Lymph Drainage (MLD) is to reduce lymph volume in the affected limb by increasing intake of lymphatic load into the lymphatic system, increasing the volume of transported lymph fluid, moving lymph fluid in superficial lymph vessels to collateral lymph collectors, anastomoses, or tissue channels, and increasing venous return. The goal of MLD is to re-route the lymph flow around blocked areas into more centrally  located healthy lymph vessels, which drain into the venous system.           Time-based treatments/modalities:    Physical Therapy Timed Treatment Charges  Manual therapy minutes (CPT 60241): 31 minutes  Therapeutic exercise minutes (CPT 55788): 10 minutes      Assessment and Plan:   Assessment details:  Carole reports some hip pain after increasing her walking yesterday. Encouraged nerve glides and strengthening at her hips as this can assist with minimizing pain. Her shapewear is providing excellent compression but question if she would be better served with longer legging-type compression. She will benefit from further intervention to achieve decongestion and to strengthen her hips/LE.     Plan:  Therapy options:  Physical therapy treatment to continue  Discussed with:  Patient

## 2025-03-31 ENCOUNTER — PHYSICAL THERAPY (OUTPATIENT)
Dept: PHYSICAL THERAPY | Facility: REHABILITATION | Age: 50
End: 2025-03-31
Attending: PHYSICIAN ASSISTANT
Payer: COMMERCIAL

## 2025-03-31 DIAGNOSIS — I89.0 LYMPHEDEMA: ICD-10-CM

## 2025-03-31 DIAGNOSIS — L90.5 SCAR: ICD-10-CM

## 2025-03-31 PROCEDURE — 97140 MANUAL THERAPY 1/> REGIONS: CPT

## 2025-03-31 PROCEDURE — 97110 THERAPEUTIC EXERCISES: CPT

## 2025-03-31 NOTE — OP THERAPY DAILY TREATMENT
Outpatient Physical Therapy  LYMPHEDEMA THERAPY DAILY TREATMENT     [facility] Physical Therapy [address]  [address]  [place] [address]  Phone:  [phone]  Fax:  [phone]    Date: 03/31/2025    Patient: Carole Garcia  YOB: 1975  MRN: [id number]     Time Calculation    Start time: 1033  Stop time: 1116 Time Calculation (min): 43 minutes         Chief Complaint: Lymphedema Aquired    Visit #: 7    Subjective:   History of Present Illness:     Mechanism of injury:  Feet have felt better; feet are not swollen but a little at the calves.       Lymphedema Objective        Therapeutic Exercises (CPT 13668):     1. BKFO, HEP    2. bridges, HEP    3. standing star pattern with pink band, HEP    4. lunge stretch against wall, HEP    5. sciatic nerve glide, HEP    6. calf raises, single/double, x15 each, added to HEP with handout    7. mini squat, x10, added to HEP with handout    8. clams, x10 each side, added to HEP with handout    Therapeutic Treatments and Modalities:     1. Manual Therapy (CPT 95737)    Therapeutic Treatment and Modalities Summary:   Manual:  -trigger point release to B hip adductors, pretibials, fibularis brevis/longus  -joint mobilization to hip into IR/ER and flexion, fibular glides, ankle DF/PF, and joint mobs at MTPs  -MLD to abdomen with stimulation of deep abdominal collectors, intercostal lymph nodes  -trigger point release to transverse abdominus, abdominal obliques, rectus abdominus, iliacus, iliopsoas  -visceral facilitation to large and small intestine.   -MLD to inguinal nodes on R and L LE followed by popliteal and surface MLD to all R LE  The purpose of Manual Lymph Drainage (MLD) is to reduce lymph volume in the affected limb by increasing intake of lymphatic load into the lymphatic system, increasing the volume of transported lymph fluid, moving lymph fluid in superficial lymph vessels to collateral lymph collectors, anastomoses, or tissue  channels, and increasing venous return. The goal of MLD is to re-route the lymph flow around blocked areas into more centrally located healthy lymph vessels, which drain into the venous system.           Time-based treatments/modalities:    Physical Therapy Timed Treatment Charges  Manual therapy minutes (CPT 80751): 33 minutes  Therapeutic exercise minutes (CPT 30021): 10 minutes      Assessment and Plan:   Assessment details:  Carole is improving her fluid movement through her B LE. She will benefit from further intervention to improve her B LE strength as well to assist with muscle mass as well as lymphatic flow.     Plan:  Therapy options:  Physical therapy treatment to continue  Discussed with:  Patient

## 2025-04-03 ENCOUNTER — PHYSICAL THERAPY (OUTPATIENT)
Dept: PHYSICAL THERAPY | Facility: REHABILITATION | Age: 50
End: 2025-04-03
Attending: PHYSICIAN ASSISTANT
Payer: COMMERCIAL

## 2025-04-03 DIAGNOSIS — L90.5 SCAR: ICD-10-CM

## 2025-04-03 DIAGNOSIS — R60.0 LOCALIZED EDEMA: ICD-10-CM

## 2025-04-03 DIAGNOSIS — I89.0 LYMPHEDEMA: ICD-10-CM

## 2025-04-03 PROCEDURE — 97140 MANUAL THERAPY 1/> REGIONS: CPT

## 2025-04-03 NOTE — OP THERAPY DAILY TREATMENT
Outpatient Physical Therapy  LYMPHEDEMA THERAPY DAILY TREATMENT     [facility] Physical Therapy [address]  [address]  [place] [address]  Phone:  [phone]  Fax:  [phone]    Date: 04/03/2025    Patient: Carole Garcia  YOB: 1975  MRN: [id number]     Time Calculation    Start time: 0904  Stop time: 0945 Time Calculation (min): 41 minutes         Chief Complaint: Lymphedema Aquired    Visit #: 8    Subjective:   History of Present Illness:     Mechanism of injury:  Feeling pretty good. Has appt with OT tomorrow.       Lymphedema Objective        Therapeutic Exercises (CPT 54763):     1. BKFO, HEP    2. bridges, HEP    3. standing star pattern with pink band, HEP    4. lunge stretch against wall, HEP    5. sciatic nerve glide, HEP    6. calf raises, single/double, HEP    7. mini squat, HEP    8. clams, HEP    Therapeutic Treatments and Modalities:     1. Manual Therapy (CPT 03005)    Therapeutic Treatment and Modalities Summary:   Manual:  -trigger point release to B hip adductors, pretibials, fibularis brevis/longus  -joint mobilization to hip into IR/ER and flexion, fibular glides, ankle DF/PF, and joint mobs at MTPs  -MLD to abdomen with stimulation of deep abdominal collectors, intercostal lymph nodes  -trigger point release to transverse abdominus, abdominal obliques, rectus abdominus, iliacus, iliopsoas  -visceral facilitation to large and small intestine.   -MLD to inguinal nodes on R and L LE followed by popliteal and surface MLD to all R LE  The purpose of Manual Lymph Drainage (MLD) is to reduce lymph volume in the affected limb by increasing intake of lymphatic load into the lymphatic system, increasing the volume of transported lymph fluid, moving lymph fluid in superficial lymph vessels to collateral lymph collectors, anastomoses, or tissue channels, and increasing venous return. The goal of MLD is to re-route the lymph flow around blocked areas into more  centrally located healthy lymph vessels, which drain into the venous system.           Time-based treatments/modalities:    Physical Therapy Timed Treatment Charges  Manual therapy minutes (CPT 01977): 41 minutes      Assessment and Plan:   Assessment details:  Carole is able to consistently wear her compression shorts that also cover her lower abdomen. LE strengthening is assisting her with building lean muscle and improving lymphatic flow. She has reduced her B LE and would like to see further reduction. Carole will benefit from further skilled intervention to decongest her B LE and improve LE strength.     Plan:  Therapy options:  Physical therapy treatment to continue  Discussed with:  Patient

## 2025-04-04 ENCOUNTER — OCCUPATIONAL THERAPY (OUTPATIENT)
Dept: OCCUPATIONAL THERAPY | Facility: MEDICAL CENTER | Age: 50
End: 2025-04-04
Attending: OBSTETRICS & GYNECOLOGY
Payer: COMMERCIAL

## 2025-04-04 ENCOUNTER — TELEPHONE (OUTPATIENT)
Dept: MEDICAL GROUP | Facility: CLINIC | Age: 50
End: 2025-04-04
Payer: COMMERCIAL

## 2025-04-04 DIAGNOSIS — C54.1 ENDOMETRIAL ADENOCARCINOMA (HCC): ICD-10-CM

## 2025-04-04 DIAGNOSIS — N94.19 DYSPAREUNIA DUE TO MEDICAL CONDITION IN FEMALE: ICD-10-CM

## 2025-04-04 PROCEDURE — 97110 THERAPEUTIC EXERCISES: CPT

## 2025-04-04 PROCEDURE — 97167 OT EVAL HIGH COMPLEX 60 MIN: CPT

## 2025-04-04 ASSESSMENT — ENCOUNTER SYMPTOMS
PAIN SCALE AT HIGHEST: 3
PAIN SCALE AT LOWEST: 0
PAIN SCALE: 0
QUALITY: ACHING
QUALITY: SHOOTING

## 2025-04-04 NOTE — OP THERAPY EVALUATION
Outpatient Occupational Therapy  INITIAL EVALUATION    [facility] Outpatient Occupational Therapy  [address]  [place] [address]  Phone:  [phone]  Fax:  [phone]    Date of Evaluation: 2025    Patient: Carole Garcia  YOB: 1975  MRN: [id number]     Referring Provider: Demetria Alfred M.D.  [address] [facility] [address]  [place] [address]   Referring Diagnosis Endometrial adenocarcinoma (HCC) [C54.1];Dyspareunia due to medical condition in female [N94.19]     Time Calculation    Start time: 11:00 am Stop time: 12:00 pm Time calculation (min): 60         Chief Complaint: Weakness    Visit Diagnoses     ICD-10-CM   1. Endometrial adenocarcinoma (HCC)  C54.1   2. Dyspareunia due to medical condition in female  N94.19       Subjective   History of Present Illness:     Date of onset:  2023    Date of surgery:  2023    History of chief complaint:  Endometrial cancer, s/p hysterectomy and bilateral salpingectomy and oophorectomy in . S/p 2 sessions of brachytherapy. Abdominal and bilateral LE lymphedema. She presents with some cramping in lower abdomen, shortened vaginal vault, core weakness.     Prior level of function:  Independent with all ADLs and IADLs    Pain:     Current pain ratin    At best pain ratin    At worst pain rating:  3    Quality:  Aching and shooting    Aggravating factors:  C/o shortened vaginal vault during intercourse. 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    Social Support:     Lives with:  Spouse    Patient Goals:     Patient goals for therapy:  Improve core and pelvic floor strength and coordination      Past Medical History:   Diagnosis Date    Allergy     Anemia     ASTHMA     Bilateral hand numbness 03/28/2014    x2 months, gradually  worsening Worse at night Aches, primarily radial aspect, digits 1-3 Tapping on wrist sends zingers Slight worse numbness with hyperextension    Cancer (HCC) 12/21/2023    uterine    Carbuncle     Dental disorder     partial denture    Endometriosis determined by laparoscopy     Gynecological disorder 10/5/2023    Cancer    Headache(784.0)     High cholesterol     Seasonal allergies 03/28/2014    Asking about allergy shot Discussed otc options    Snoring     URI (upper respiratory infection) 09/13/2013    x3 weeks in chest Same, not worse Sob, productive, no fever     Past Surgical History:   Procedure Laterality Date    MI INTRAOP ID SENTINEL NODE  12/28/2023    Procedure: IDENTIFICATION, LYMPH NODE, SENTINEL, INTRAOPERATIVE, USING DYE INJECTION;  Surgeon: Jesus Rodriguez M.D.;  Location: SURGERY [facility];  Service: Gyn Robotic    HYSTERECTOMY ROBOTIC XI  12/28/2023    Procedure: ROBOTIC HYSTERECTOMY, BILATERAL SALPINGO-OOPHRECTOMY;  Surgeon: Jesus Rodriguez M.D.;  Location: SURGERY [facility];  Service: Gyn Robotic    NODE BIOPSY SENTINEL Bilateral 12/28/2023    Procedure: BIOPSY, LYMPH NODE, SENTINEL;  Surgeon: Jesus Rodriguez M.D.;  Location: SURGERY [facility];  Service: Gyn Robotic    CHOLECYSTECTOMY  01/01/1999    endometriosis    DILATION AND CURETTAGE      miscarraige    OTHER      Gallbladder, tonsils, carbuncle    TONSILLECTOMY AND ADENOIDECTOMY      as a child       Precautions:        Objective       Urine:     Urine volume: Medium to a lot     Bladder emptying: Feels complete     Times while awake: Every hour or so throughout the day     Times during sleeping hours: 1     Sensation/urge: Yes     Urge delay: 5-10 minutes     Intake: working on drinking 80 oz of water a day     Irritants: 1/2 pot of coffee a day or 1 can of Coke zero     Leakage: None     Frequency of leakage: N/a     Symptom aggravator(s): none     Form of protection used: N/a           Feces:     Bowel sensation: Yes     Urge delay: 10-15  minutes     BM frequency: 1-2x/day     Bowel leakage: None     Frequency of bowel leakage: N/a     Strain/constipation: No, uses laxative when needed     Protection used: None     Symptom relief: Laxative helps keep bowels regular          Perineal/Pelvic Floor Examination:     Chaperone present: No      Signed consent for pelvic floor examination: No signed or written consent for internal examination received today      Method: Internal examination deferred - no consent given. External evaluation of abdomen, discussed pelvic floor symptoms      Sensation: Yes     Skin: Not assessed     Abnormalities: Pt reports internal scarring from wound dehiscence after hysterectomy and during bracytherapy. Healed. She states is sometimes sensitive and feels like a deep ache in lower abdomen/trA     Perineal scar description: Healed     Perineal body/introitus: Pt denies tenderness      Pelvic floor contraction: Yes      Strength: Approximately 2+/5     Relaxation after contractions: Yes      Core strength and function: Weak trA, impaired diaphragm extension. Tight diaphragm, RA, and trA with trigger points in bilateral RA. 3-/5 trA strengtth.     Coordination of diaphragm and pelvic floor: Impaired     Prolapse: to be assessed in future visits      Valsalva response: Slight descent      Tone: High     Pain/trigger points: Bilateral diaphragm and trA     Functional Outcome Measure:  NIH Chronic Prostatitis Symptom Index (NIH-CPSI)  Pain or Discomfort   In the last week, has the patient experienced any pain or discomfort in the following areas?    Perineum:   no    Labia:   no    Clitoris:  no    Pubic area:   no    Rectal area:   no  In the last week, has the patient experienced the following?    Pain or burning during urination:   no    Pain or discomfort during or after sexual climax:   no  How often did the patient have pain or discomfort in any of these areas over the last week?:   none  Which number best describes the  AVERAGE pain or discomfort on the days that she had it, over the last week? (0=No pain; 10 = worst imaginable pain):   0    Urination  How often did the patient have a sensation of not emptying her bladder completely after she finished urinating, over the last week?:   none  How often has she had to urinate again less than two hours after she finished urinating, over the last week?:   none    Impact of Symptoms  How much has her symptoms kept her from doing the kinds of things she would usually do, over the last week?:   not at all  How much did she think about her symptoms, over the last week?:   none    Quality of Life  If the patient were to spend the rest of her life with her symptoms just the way they have been during the last week, how would she feel about that?:  2    NIH - Chronic Prostatitis Symptom Index (NIH-CPSI) Score Summary    Pain Score:  0    Urinary Symptoms Score:  0    Quality of Life Impact Score:   0      Therapeutic Exercises (CPT 84801):     1. Diaphragmatic breathing with activation of trA and pelvic floor on exhale, Done from each sidelying and sitting      Time-based treatments/modalities:            Pelvic Floor Education/HEP:    Diaphragmatic breathing with activation of trA and pelvic floor on exhale, done from sitting and side lying  Dilator use to extend vaginal vault: strain/counterstrain  Discussed positions to reduce pressure on back of vaginal vault during intercourse.      Assessment, Response and Plan:   Impairments: activity intolerance    Other Impairments:  Dyspareunia, weak core with impaired coordination between pelvic floor and co-recruiters  Assessment details:  Pt presents with core and pelvic floor weakness and impaired coordination between pelvic floor and co-recruiters. She has a tight core with trigger points. She reports she does not have pain with vaginal penetration or sexual intimacy but does state that sometimes penetration is uncomfortable due to shortened  vaginal vault s/p surgery and radiation. She declined internal examination today. Skilled pelvic floor OT needed to improve strength, coordination, and functional  use of pelvic floor and core.  Barriers to therapy:  None  Prognosis: good    Goals:   Short Term Goals:   4/-5 core and pelvic floor strength with good contract/relax patterns and normal co-recruitment  Independent and compliant with pelvic floor/core HEP for strength and coordination.  Short term goal timespan:  2-4 weeks    Long Term Goals:   Pt to tolerate deep vaginal penetration without discomfort  4+/5 core and pelvic floor strength with good contract/relax patterns and normal co-recruitment  Long term goal timespan:  6-8 weeks    Plan:   Therapy options:  Occupational therapy treatment to continue  Planned therapy interventions:  Manual Therapy (CPT 52567), Therapeutic Exercise (CPT 97784), Therapeutic Activities (CPT 29197) and Self Care ADL Training (CPT 53116)  Frequency:  1x week  Duration in weeks:  8  Duration in visits:  8  Discussed with:  Patient        Referring provider co-signature:  I have reviewed this plan of care and my co-signature certifies the need for services.    Certification Period: 04/04/2025 to  06/02/25    Physician Signature: ________________________________ Date: ______________

## 2025-04-04 NOTE — TELEPHONE ENCOUNTER
DOCUMENTATION OF PAR STATUS:    1. Name of Medication & Dose: Omeprazole      2. Name of Prescription Coverage Company & phone #: [employer]     3. Date Prior Auth Submitted: 4/4/25    4. What information was given to obtain insurance decision? Dx code     5. Prior Auth Status? Approved         6. Patient Notified: N\A

## 2025-04-07 ENCOUNTER — APPOINTMENT (OUTPATIENT)
Dept: PHYSICAL THERAPY | Facility: REHABILITATION | Age: 50
End: 2025-04-07
Attending: PHYSICIAN ASSISTANT
Payer: COMMERCIAL

## 2025-04-10 ENCOUNTER — PHYSICAL THERAPY (OUTPATIENT)
Dept: PHYSICAL THERAPY | Facility: REHABILITATION | Age: 50
End: 2025-04-10
Attending: PHYSICIAN ASSISTANT
Payer: COMMERCIAL

## 2025-04-10 DIAGNOSIS — L90.5 SCAR: ICD-10-CM

## 2025-04-10 DIAGNOSIS — I89.0 LYMPHEDEMA: ICD-10-CM

## 2025-04-10 PROCEDURE — 97140 MANUAL THERAPY 1/> REGIONS: CPT

## 2025-04-10 PROCEDURE — 97110 THERAPEUTIC EXERCISES: CPT

## 2025-04-10 NOTE — OP THERAPY DAILY TREATMENT
Outpatient Physical Therapy  LYMPHEDEMA THERAPY DAILY TREATMENT     [facility] Physical Therapy [address]  [address]  [place] [address]  Phone:  [phone]  Fax:  [phone]    Date: 04/10/2025    Patient: Carole Garcia  YOB: 1975  MRN: [id number]     Time Calculation    Start time: 0902  Stop time: 0946 Time Calculation (min): 44 minutes         Chief Complaint: Lymphedema Aquired    Visit #: 9    Subjective:   History of Present Illness:     Mechanism of injury:  Felt so good this past week. Legs were feeling great, has reduced pain med frequency.       Lymphedema Objective    Left Lower Extremity Circumferential Measurements  Waist: cm  Hip: cm  Scrotum: cm  Ground/Upper Thigh: 64.2 cm  Mid Thigh: 47.5 cm  Knee: 35.3 cm  Upper Calf: 40.2 cm  Mid Calf: 32.2 cm  Ankle: 24.6 cm  Heel to Foot: 23 cm  Total: 267 cm    Right Lower Extremity Circumferential Measurements  Waist: cm  Hip: cm  Scrotum: cm  Ground/Upper Thigh: 66.3 cm  Mid Thigh: 50.4 cm  Knee: 36.2 cm  Upper Calf: 41.2 cm  Mid Calf: 30.2 cm  Ankle: 24.6 cm  Heel to Foot: 22.2 cm  Total: 271.1 cm        Left Lower Extremity Circumferential Measurements 3/20  Waist: cm  Hip: cm  Scrotum: cm  Ground/Upper Thigh: 63.1 cm  Mid Thigh: 48.2 cm  Knee: 35.1 cm  Upper Calf: 39.3 cm  Mid Calf: 29.3 cm  Ankle: 24.5 cm  Heel to Foot: 22.1 cm  Total: 261.6 cm     Right Lower Extremity Circumferential Measurements 3/20  Waist: cm  Hip: cm  Scrotum: cm  Ground/Upper Thigh: 63.2 cm  Mid Thigh: 50.2 cm  Knee: 37.4 cm  Upper Calf: 41.2 cm  Mid Calf: 30.8 cm  Ankle: 24.1 cm  Heel to Foot: 22.7 cm  Total: 269.6 cm          Left Lower Extremity Circumferential Measurements EVAL  Waist: cm  Hip: cm  Scrotum: cm  Ground/Upper Thigh: 65 cm  Mid Thigh: 49.4 cm  Knee: 37.4 cm  Upper Calf: 41 cm  Mid Calf: 29.7 cm  Ankle: 24.2 cm  Heel to Foot: 23.2 cm  Total: 269.9 cm     Right Lower Extremity Circumferential Measurements EVAL  Waist:  cm  Hip: cm  Scrotum: cm  Ground/Upper Thigh: 67 cm  Mid Thigh: 52.2 cm  Knee: 38.6 cm  Upper Calf: 42.4 cm  Mid Calf: 31.7 cm  Ankle: 24.2 cm  Heel to Foot: 22.3 cm  Total: 278.4 cm    Therapeutic Exercises (CPT 70057):     1. BKFO, HEP    2. bridges, HEP    3. standing star pattern with pink band, HEP    4. lunge stretch against wall, HEP    5. sciatic nerve glide, HEP    6. calf raises, single/double, HEP    7. mini squat, HEP    8. clams, HEP    9. lunges, x10, added to HEP    10. standing firehydrant with pink band, x5 each, added to HEP    11. standing hip extension with pink band, x5 each, added to HEP    Therapeutic Treatments and Modalities:     1. Manual Therapy (CPT 82111)    Therapeutic Treatment and Modalities Summary:   Manual:  -trigger point release to B hip adductors, pretibials, fibularis brevis/longus  -joint mobilization to hip into IR/ER and flexion, fibular glides, ankle DF/PF, and joint mobs at MTPs  -MLD to abdomen with stimulation of deep abdominal collectors, intercostal lymph nodes  -trigger point release to transverse abdominus, abdominal obliques, rectus abdominus, iliacus, iliopsoas  -visceral facilitation to large and small intestine.   -MLD to inguinal nodes on R and L LE followed by popliteal and surface MLD to all R LE  The purpose of Manual Lymph Drainage (MLD) is to reduce lymph volume in the affected limb by increasing intake of lymphatic load into the lymphatic system, increasing the volume of transported lymph fluid, moving lymph fluid in superficial lymph vessels to collateral lymph collectors, anastomoses, or tissue channels, and increasing venous return. The goal of MLD is to re-route the lymph flow around blocked areas into more centrally located healthy lymph vessels, which drain into the venous system.           Time-based treatments/modalities:    Physical Therapy Timed Treatment Charges  Manual therapy minutes (CPT 45396): 35 minutes  Therapeutic exercise minutes (CPT  68081): 9 minutes      Assessment and Plan:   Assessment details:  Carole has sustained an increase to her B LE, but she has still reduced since evaluation. Her pain is improved to her back and legs but she will benefit from further intervention to decongest her B LE and strengthen her B LE to assist with lean muscle growth and fluid movement.     Plan:  Therapy options:  Physical therapy treatment to continue  Discussed with:  Patient

## 2025-04-11 NOTE — OP THERAPY PROGRESS SUMMARY
Outpatient Physical Therapy  PROGRESS SUMMARY NOTE      [facility] [address]  [address]  [place] [address]  Phone:  [phone]  Fax:  [phone]    Date of Visit: 04/10/2025    Patient: Carole Garcia  YOB: 1975  MRN: [id number]     Referring Provider: Janet Guerrero P.A.-C.  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 Lymphedema  I89.0   2. Scar  L90.5       Rehab Potential: good    Progress Report Period: 3/10/25-4/10/25    Functional Assessment Used          Objective Findings and Assessment:   Patient progression towards goals: Carole has sustained an increase to her B LE, but she has still reduced since evaluation. Her pain is improved to her back and legs but she will benefit from further intervention to decongest her B LE and strengthen her B LE to assist with lean muscle growth and fluid movement.       Objective findings and assessment details: Left Lower Extremity Circumferential Measurements 4/10/25  Ground/Upper Thigh: 64.2 cm  Mid Thigh: 47.5 cm  Knee: 35.3 cm  Upper Calf: 40.2 cm  Mid Calf: 32.2 cm  Ankle: 24.6 cm  Heel to Foot: 23 cm  Total: 267 cm     Right Lower Extremity Circumferential Measurements 4/10/25  Ground/Upper Thigh: 66.3 cm  Mid Thigh: 50.4 cm  Knee: 36.2 cm  Upper Calf: 41.2 cm  Mid Calf: 30.2 cm  Ankle: 24.6 cm  Heel to Foot: 22.2 cm  Total: 271.1 cm          Left Lower Extremity Circumferential Measurements 3/20  Ground/Upper Thigh: 63.1 cm  Mid Thigh: 48.2 cm  Knee: 35.1 cm  Upper Calf: 39.3 cm  Mid Calf: 29.3 cm  Ankle: 24.5 cm  Heel to Foot: 22.1 cm  Total: 261.6 cm     Right Lower Extremity Circumferential Measurements 3/20  Ground/Upper Thigh: 63.2 cm  Mid Thigh: 50.2  cm  Knee: 37.4 cm  Upper Calf: 41.2 cm  Mid Calf: 30.8 cm  Ankle: 24.1 cm  Heel to Foot: 22.7 cm  Total: 269.6 cm          Left Lower Extremity Circumferential Measurements EVAL  Ground/Upper Thigh: 65 cm  Mid Thigh: 49.4 cm  Knee: 37.4 cm  Upper Calf: 41 cm  Mid Calf: 29.7 cm  Ankle: 24.2 cm  Heel to Foot: 23.2 cm  Total: 269.9 cm     Right Lower Extremity Circumferential Measurements EVAL  Ground/Upper Thigh: 67 cm  Mid Thigh: 52.2 cm  Knee: 38.6 cm  Upper Calf: 42.4 cm  Mid Calf: 31.7 cm  Ankle: 24.2 cm  Heel to Foot: 22.3 cm  Total: 278.4 cm      Goals:   Short Term Goals:    1. Pt will achieve a total limb circumference reduction of 5cm in order to decrease risk for infection and progression of disease process.Partially Met  2. Pt will be independent with self-MLD to facilitate fluid migration to healthy tissues and lymph nodes. Met   3. Pt will consistently perform exercises with compression on to facilitate muscle pump of fluid from affected extremity. Met    Short term goal time span:  2-4 weeks      Long Term Goals:    1. Pt will have a reduction in total limb circumference of 8cm in order to decrease risk for infection and progression of disease process. Not Met  2. Patient will be independent with maintenance phase management of lymphedema including: compression garments, skin care education, risk reduction strategies, manual lymphatic drainage, and therapeutic exercise. Not Met    Long term goal time span:  4-6 weeks    Plan:   Planned therapy interventions:  Functional Training, Self Care (CPT 00458), Manual Therapy (CPT 83318), Therapeutic Activities (CPT 38028) and Therapeutic Exercise (CPT 37936)  Frequency:  1x week  Duration in weeks:  8      Referring provider co-signature:  I have reviewed this plan of care and my co-signature certifies the need for services.     Certification Period: 04/10/2025 to 06/05/25    Physician Signature: ________________________________ Date: ______________

## 2025-04-14 ENCOUNTER — PHYSICAL THERAPY (OUTPATIENT)
Dept: PHYSICAL THERAPY | Facility: REHABILITATION | Age: 50
End: 2025-04-14
Attending: PHYSICIAN ASSISTANT
Payer: COMMERCIAL

## 2025-04-14 DIAGNOSIS — L90.5 SCAR: ICD-10-CM

## 2025-04-14 DIAGNOSIS — I89.0 LYMPHEDEMA: ICD-10-CM

## 2025-04-14 PROCEDURE — 97140 MANUAL THERAPY 1/> REGIONS: CPT

## 2025-04-14 NOTE — OP THERAPY DAILY TREATMENT
Outpatient Physical Therapy  LYMPHEDEMA THERAPY DAILY TREATMENT     [facility] [address].  [address]  [place] [address]  Phone:  [phone]  Fax:  [phone]    Date: 04/14/2025    Patient: Carole Garcia  YOB: 1975  MRN: [id number]     Time Calculation    Start time: 0948  Stop time: 1028 Time Calculation (min): 40 minutes         Chief Complaint: Lymphedema Aquired and Scar    Visit #: 10    Subjective:   History of Present Illness:     Mechanism of injury:  Did a lot of cleaning this weekend and had some thigh and back pain.       Lymphedema Objective        Therapeutic Exercises (CPT 65642):     1. BKFO, HEP    2. bridges, HEP    3. standing star pattern with pink band, HEP    4. lunge stretch against wall, HEP    5. sciatic nerve glide, HEP    6. calf raises, single/double, HEP    7. mini squat, HEP    8. clams, HEP    9. lunges, HEP    10. standing firehydrant with pink band, HEP    11. standing hip extension with pink band, HEP    Therapeutic Treatments and Modalities:     1. Manual Therapy (CPT 23712)    Therapeutic Treatment and Modalities Summary:   Manual:  -trigger point release to B hip adductors, pretibials, fibularis brevis/longus  -joint mobilization to hip into IR/ER and flexion, fibular glides, ankle DF/PF, and joint mobs at MTPs  -MLD to abdomen with stimulation of deep abdominal collectors, intercostal lymph nodes  -trigger point release to transverse abdominus, abdominal obliques, rectus abdominus, iliacus, iliopsoas  -visceral facilitation to large and small intestine.   -MLD to inguinal nodes on R and L LE followed by popliteal and surface MLD to all R LE  The purpose of Manual Lymph Drainage (MLD) is to reduce lymph volume in the affected limb by increasing intake of lymphatic load into the lymphatic system, increasing the volume of transported lymph fluid, moving lymph fluid in superficial lymph vessels to collateral lymph collectors,  anastomoses, or tissue channels, and increasing venous return. The goal of MLD is to re-route the lymph flow around blocked areas into more centrally located healthy lymph vessels, which drain into the venous system.           Time-based treatments/modalities:    Physical Therapy Timed Treatment Charges  Manual therapy minutes (CPT 35335): 40 minutes      Assessment and Plan:   Assessment details:  Suspect some of Carole's discomfort when more active is related to hip weakness. She is reducing her B LE and requires further intervention to assist with sustaining measurements and improving her B LE strength.     Plan:  Therapy options:  Physical therapy treatment to continue  Discussed with:  Patient

## 2025-05-01 ENCOUNTER — APPOINTMENT (OUTPATIENT)
Dept: URBAN - METROPOLITAN AREA CLINIC 31 | Facility: CLINIC | Age: 50
Setting detail: DERMATOLOGY
End: 2025-05-01

## 2025-05-01 ENCOUNTER — APPOINTMENT (OUTPATIENT)
Dept: PHYSICAL THERAPY | Facility: REHABILITATION | Age: 50
End: 2025-05-01
Attending: PHYSICIAN ASSISTANT
Payer: COMMERCIAL

## 2025-05-01 DIAGNOSIS — D22 MELANOCYTIC NEVI: ICD-10-CM

## 2025-05-01 DIAGNOSIS — Z71.89 OTHER SPECIFIED COUNSELING: ICD-10-CM

## 2025-05-01 DIAGNOSIS — L82.1 OTHER SEBORRHEIC KERATOSIS: ICD-10-CM

## 2025-05-01 DIAGNOSIS — D18.0 HEMANGIOMA: ICD-10-CM

## 2025-05-01 DIAGNOSIS — Q85.82 OTHER COWDEN SYNDROME: ICD-10-CM

## 2025-05-01 DIAGNOSIS — L81.4 OTHER MELANIN HYPERPIGMENTATION: ICD-10-CM

## 2025-05-01 PROBLEM — D23.72 OTHER BENIGN NEOPLASM OF SKIN OF LEFT LOWER LIMB, INCLUDING HIP: Status: ACTIVE | Noted: 2025-05-01

## 2025-05-01 PROBLEM — D18.01 HEMANGIOMA OF SKIN AND SUBCUTANEOUS TISSUE: Status: ACTIVE | Noted: 2025-05-01

## 2025-05-01 PROBLEM — D22.5 MELANOCYTIC NEVI OF TRUNK: Status: ACTIVE | Noted: 2025-05-01

## 2025-05-01 PROCEDURE — 99213 OFFICE O/P EST LOW 20 MIN: CPT

## 2025-05-01 PROCEDURE — ? COUNSELING

## 2025-05-01 ASSESSMENT — LOCATION SIMPLE DESCRIPTION DERM: LOCATION SIMPLE: RIGHT UPPER BACK

## 2025-05-01 ASSESSMENT — LOCATION ZONE DERM: LOCATION ZONE: TRUNK

## 2025-05-01 ASSESSMENT — LOCATION DETAILED DESCRIPTION DERM
LOCATION DETAILED: RIGHT MEDIAL UPPER BACK
LOCATION DETAILED: RIGHT SUPERIOR UPPER BACK
LOCATION DETAILED: RIGHT INFERIOR UPPER BACK

## 2025-05-08 ENCOUNTER — APPOINTMENT (OUTPATIENT)
Dept: PHYSICAL THERAPY | Facility: REHABILITATION | Age: 50
End: 2025-05-08
Attending: PHYSICIAN ASSISTANT
Payer: COMMERCIAL

## 2025-05-18 DIAGNOSIS — E66.812 CLASS 2 OBESITY DUE TO EXCESS CALORIES WITHOUT SERIOUS COMORBIDITY WITH BODY MASS INDEX (BMI) OF 39.0 TO 39.9 IN ADULT: ICD-10-CM

## 2025-05-18 DIAGNOSIS — E66.09 CLASS 2 OBESITY DUE TO EXCESS CALORIES WITHOUT SERIOUS COMORBIDITY WITH BODY MASS INDEX (BMI) OF 39.0 TO 39.9 IN ADULT: ICD-10-CM

## 2025-05-19 RX ORDER — METFORMIN HYDROCHLORIDE 500 MG/1
TABLET, EXTENDED RELEASE ORAL
Qty: 132 TABLET | Refills: 0 | Status: SHIPPED | OUTPATIENT
Start: 2025-05-19

## 2025-05-20 NOTE — PROGRESS NOTES
"    Primary Care Provider Janet Guerrero P.A.-C.   Referring Provider: Janet VEE / Briseida Wallis PT  Surgical Oncologist: Dr Jesus Rodriguez   Medical Oncologist: Raghav Downing MD    Radiation Oncologist: Manan Jackson MD       HPI:  49 y.o. 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  She states that she has been using vaginal moisturizers and liberally using lubricants with intercourse.  They did add the buffer which she states which she states has made vaginal penetration easier.  Her main issue is just a foreshortened vagina since surgery and radiation.  She states arousal is totally intact as well as orgasmic function.  She did see the pelvic floor therapist.  From a vaginal dilator standpoint she states that she did not order any vaginal dilators although has \"toys\" which have provided that function and she has been using them regularly.  She states that she has been on vaginal estrogen before prescribed by Dr. Barillas although that prescription ran out and it was just never mentioned again.    She did start metformin as well as working from a nutritional standpoint and her food choices.  She states she tolerated the metformin very well and did notice that maybe she was not quite eating as much but has not lost any weight and is interested in actually pursuing GLP-1 receptor agonist use instead.  She is hoping to do this " through a pharmacotherapy clinic versus resorting to compounded medications.  She states her primary care has done a referral for endocrine care as well as to the pulmonary clinic due to 2 nodules she had.  She states that she has not had any labs done as far as hemoglobin A1c or lipids for quite a while.  She felt like her primary care did give her an order for these at [facility] but she is not able to find it.  She is willing to go get these labs done before seeing the pharmacotherapy clinic.      Carole Garcia has a current medication list which includes the following prescription(s): estradiol, metformin er, levothyroxine, omeprazole, fezolinetant, albuterol, multiple vitamins-minerals, vitamin d3, duloxetine, budesonide-formoterol, acetaminophen, and ibuprofen.     Problem List[1]     Past Surgical History[2]     Social History[3]     Family History   Problem Relation Age of Onset    Diabetes Mother     Heart Disease Mother     Hypertension Mother     Hyperlipidemia Mother     DVT Mother     Cancer Father 57        colon    Genetic Disorder Brother         thyroid    Diabetes Maternal Aunt     Heart Disease Maternal Aunt     Hypertension Maternal Aunt     Hyperlipidemia Maternal Aunt     Diabetes Maternal Grandmother     Heart Disease Maternal Grandmother     Hypertension Maternal Grandmother     Hyperlipidemia Maternal Grandmother     Alcohol/Drug Maternal Grandfather         Pancreatits        Carole Garcia is allergic to advair diskus.     Vitals:    05/21/25 1053   BP: 128/87   Pulse: 92   Temp: 36.6 °C (97.9 °F)   SpO2: 95%     Body mass index is 39.65 kg/m².    Physical Exam  Vitals reviewed.   Constitutional:       Appearance: Normal appearance.   Skin:     General: Skin is warm.   Neurological:      General: No focal deficit present.      Mental Status: She is alert and oriented to person, place, and time.   Psychiatric:         Mood and Affect: Mood normal.         Behavior: Behavior normal.          Thought Content: Thought content normal.          1. Class 2 obesity due to excess calories without serious comorbidity with body mass index (BMI) of 39.0 to 39.9 in adult  - Referral to Pharmacotherapy Service  - CRP HIGH SENSITIVE (CARDIAC); Future  - Comp Metabolic Panel; Future  - INSULIN FASTING; Future  - HEMOGLOBIN A1C; Future  - Lipid Profile; Future    2. Endometrial adenocarcinoma (HCC)  - Referral to Pharmacotherapy Service    3. Genitourinary syndrome of menopause  - estradiol (ESTRACE) 0.1 MG/GM vaginal cream; Insert 1 g into the vagina two times a week.  Dispense: 42.5 g; Refill: 1    4. Dyspareunia due to medical condition in female  - estradiol (ESTRACE) 0.1 MG/GM vaginal cream; Insert 1 g into the vagina two times a week.  Dispense: 42.5 g; Refill: 1       This patient is aware that the goal of the oncology wellness clinic is to assist in optimizing health, sense of well-being, reducing recurrence risk, and improving quality of life after a cancer diagnosis. For those that are high risk for a cancer diagnosis our goal is focused on preventative lifestyle strategies.       Our current plan includes:     .Genitourinary syndrome of menopause with dyspareunia with shortening of the vaginal vault due to treatments for endometrial cancer.  We discussed adding some vaginal estrogen which she has used in the past.  Will going to do half gram twice a week as well as continued use of vaginal dilator with gentle pressure upward towards the length of the vagina.  As well as continued use of the O nut as a vaginal buffer.    Given interest in losing weight.  I have ordered labs as well as placed a referral to pharmacotherapy clinic and she will stop the metformin.  I will plan to see the patient back in about 6 to 8 weeks to follow-up and see how she is doing.  Consider pelvic exam at follow-up visit to just overall assess vaginal length I do not have the equipment for this due to our current move but in the  future in the new office setting this would be helpful and appropriate.      Total time spent today, including personal review of past history, face to face time, chart documentation, and  was 33  minutes.            [1]   Patient Active Problem List  Diagnosis    Asthma in adult    Obesity    Tobacco abuse    Seasonal allergies    Bilateral hand numbness    Iron deficiency anemia    Vitamin D deficiency    Dyslipidemia    Endometrial adenocarcinoma (HCC)    Prediabetes    Class 2 obesity with body mass index (BMI) of 39.0 to 39.9 in adult    Macular degeneration, wet (HCC)    Family history of clotting disorder    Dyspepsia    PTEN hamartoma tumor syndrome (PHTS) (HCC)    Thyroid nodule    Localized edema    Hypothyroidism due to Hashimoto thyroiditis    Lung nodule   [2]   Past Surgical History:  Procedure Laterality Date    DE INTRAOP ID SENTINEL NODE  2023    Procedure: IDENTIFICATION, LYMPH NODE, SENTINEL, INTRAOPERATIVE, USING DYE INJECTION;  Surgeon: Jesus Rodriguez M.D.;  Location: SURGERY [facility];  Service: Gyn Robotic    HYSTERECTOMY ROBOTIC XI  2023    Procedure: ROBOTIC HYSTERECTOMY, BILATERAL SALPINGO-OOPHRECTOMY;  Surgeon: Jesus Rodriguez M.D.;  Location: SURGERY [facility];  Service: Gyn Robotic    NODE BIOPSY SENTINEL Bilateral 2023    Procedure: BIOPSY, LYMPH NODE, SENTINEL;  Surgeon: Jesus Rodriguez M.D.;  Location: SURGERY [facility];  Service: Gyn Robotic    CHOLECYSTECTOMY  1999    endometriosis    DILATION AND CURETTAGE      miscarraige    OTHER      Gallbladder, tonsils, carbuncle    TONSILLECTOMY AND ADENOIDECTOMY      as a child   [3]   Social History  Tobacco Use    Smoking status: Former     Current packs/day: 0.00     Average packs/day: 0.6 packs/day for 26.6 years (17.0 ttl pk-yrs)     Types: Cigarettes     Start date: 1998     Quit date: 2017     Years since quittin.4    Smokeless tobacco: Never   Vaping Use    Vaping status: Never Used    Substance Use Topics    Alcohol use: Not Currently     Alcohol/week: 1.2 oz     Types: 1 Glasses of wine, 1 Cans of beer per week     Comment: maybe once/month    Drug use: Not Currently     Types: Oral     Comment: I have taken THC Gummies to help with sleep but not current

## 2025-05-21 ENCOUNTER — OFFICE VISIT (OUTPATIENT)
Dept: SURGERY | Facility: MEDICAL CENTER | Age: 50
End: 2025-05-21
Payer: COMMERCIAL

## 2025-05-21 VITALS
TEMPERATURE: 97.9 F | DIASTOLIC BLOOD PRESSURE: 87 MMHG | HEART RATE: 92 BPM | BODY MASS INDEX: 39.44 KG/M2 | OXYGEN SATURATION: 95 % | HEIGHT: 64 IN | SYSTOLIC BLOOD PRESSURE: 128 MMHG | WEIGHT: 231 LBS

## 2025-05-21 DIAGNOSIS — E66.09 CLASS 2 OBESITY DUE TO EXCESS CALORIES WITHOUT SERIOUS COMORBIDITY WITH BODY MASS INDEX (BMI) OF 39.0 TO 39.9 IN ADULT: Primary | ICD-10-CM

## 2025-05-21 DIAGNOSIS — C54.1 ENDOMETRIAL ADENOCARCINOMA (HCC): ICD-10-CM

## 2025-05-21 DIAGNOSIS — N94.19 DYSPAREUNIA DUE TO MEDICAL CONDITION IN FEMALE: ICD-10-CM

## 2025-05-21 DIAGNOSIS — N95.8 GENITOURINARY SYNDROME OF MENOPAUSE: ICD-10-CM

## 2025-05-21 DIAGNOSIS — E66.812 CLASS 2 OBESITY DUE TO EXCESS CALORIES WITHOUT SERIOUS COMORBIDITY WITH BODY MASS INDEX (BMI) OF 39.0 TO 39.9 IN ADULT: Primary | ICD-10-CM

## 2025-05-21 PROCEDURE — 3079F DIAST BP 80-89 MM HG: CPT | Performed by: OBSTETRICS & GYNECOLOGY

## 2025-05-21 PROCEDURE — 99214 OFFICE O/P EST MOD 30 MIN: CPT | Performed by: OBSTETRICS & GYNECOLOGY

## 2025-05-21 PROCEDURE — 3074F SYST BP LT 130 MM HG: CPT | Performed by: OBSTETRICS & GYNECOLOGY

## 2025-05-21 RX ORDER — ESTRADIOL 0.1 MG/G
1 CREAM VAGINAL
Qty: 42.5 G | Refills: 1 | Status: SHIPPED | OUTPATIENT
Start: 2025-05-21

## 2025-05-21 ASSESSMENT — FIBROSIS 4 INDEX: FIB4 SCORE: 0.58

## 2025-05-23 ENCOUNTER — TELEPHONE (OUTPATIENT)
Dept: HEALTH INFORMATION MANAGEMENT | Facility: OTHER | Age: 50
End: 2025-05-23
Payer: COMMERCIAL

## 2025-06-06 ENCOUNTER — OFFICE VISIT (OUTPATIENT)
Dept: MEDICAL GROUP | Facility: PHYSICIAN GROUP | Age: 50
End: 2025-06-06
Payer: COMMERCIAL

## 2025-06-06 VITALS — HEART RATE: 90 BPM | HEIGHT: 64 IN | WEIGHT: 231 LBS | OXYGEN SATURATION: 95 % | BODY MASS INDEX: 39.44 KG/M2

## 2025-06-06 DIAGNOSIS — R73.03 PREDIABETES: ICD-10-CM

## 2025-06-06 DIAGNOSIS — E78.5 DYSLIPIDEMIA: Primary | ICD-10-CM

## 2025-06-06 PROCEDURE — 99403 PREV MED CNSL INDIV APPRX 45: CPT | Performed by: FAMILY MEDICINE

## 2025-06-06 RX ORDER — SEMAGLUTIDE 0.25 MG/.5ML
0.25 INJECTION, SOLUTION SUBCUTANEOUS
Qty: 2 ML | Refills: 6 | Status: SHIPPED | OUTPATIENT
Start: 2025-06-06

## 2025-06-06 RX ORDER — SEMAGLUTIDE 0.68 MG/ML
0.25 INJECTION, SOLUTION SUBCUTANEOUS
Qty: 3 ML | Refills: 6 | Status: SHIPPED | OUTPATIENT
Start: 2025-06-06 | End: 2025-06-16 | Stop reason: SDUPTHER

## 2025-06-06 ASSESSMENT — FIBROSIS 4 INDEX: FIB4 SCORE: 0.58

## 2025-06-06 NOTE — PROGRESS NOTES
"Patient Consult Note     TIME IN: 338 pm   TIME OUT: 420 pm      PCP:  Janet Guerrero P.A.-C.  [address] [facility] [address]  [facility] [id number] [phone]    Patient Care Team                Janet Guerrero P.A.-C. PCP - General, Physician Assistant [phone]     [address] [facility] [address] [facility] [address]    Briseida Wallis, PT Physical Therapist, Physical Therapy           Mayra Greenwood, OT Occupational Therapist, Occupational Therapist [phone]     [address] [facility] [address] [place] [address]            Reason for consult:     HPI:  Carole Garcia is a 49 y.o. old patient who comes in today for evaluation of above stated problem.    Allergies:  Advair diskus    Physical Examination:  Vital signs: LMP 12/02/2023  There is no height or weight on file to calculate BMI.    Most Recent labs, A1c, and immunizations:    No results found for: \"HBA1C\"   Lab Results   Component Value Date/Time    CHOLSTRLTOT 209 (H) 05/07/2014 09:00 AM    LDL see below 05/07/2014 09:00 AM    HDL 29 (A) 05/07/2014 09:00 AM    TRIGLYCERIDE 434 (H) 05/07/2014 09:00 AM       No results found for: \"LIPOPROTA\"   Low (favorable) = < 30 mg/dl or < 75 nmol/L  Intermediate =  30-49 mg/dl or  nmol/L  High (unfavorable) = >50 mg/dl or >125 nmol/L  Very High = >100 mg/dl or >225 nmol/L    No results found for: \"APOB\"    LDL-C < 55 = apoB <60 mg/dl  LDL-C <70 = apoB <70 mg/dl  LDL-C <100 = apo B <90 mg dl    Lab Results   Component Value Date/Time    SODIUM 136 12/26/2023 09:51 AM    POTASSIUM 4.0 12/26/2023 09:51 AM    CHLORIDE 103 12/26/2023 09:51 AM    CO2 22 12/26/2023 09:51 AM    GLUCOSE 99 12/26/2023 09:51 AM    BUN 7 (L) 12/26/2023 09:51 AM    CREATININE 0.76 12/26/2023 09:51 AM     Lab Results   Component Value Date/Time    ALKPHOSPHAT 81 12/26/2023 09:51 AM    ASTSGOT 19 12/26/2023 09:51 AM    ALTSGPT 31 12/26/2023 09:51 AM    TBILIRUBIN 0.4 12/26/2023 09:51 AM    INR 1.07 12/26/2023 09:51 AM    ALBUMIN 4.3 " "12/26/2023 09:51 AM      Lab Results   Component Value Date/Time    RBC 4.62 12/26/2023 09:51 AM    HEMOGLOBIN 13.2 12/26/2023 09:51 AM    HEMATOCRIT 40.9 12/26/2023 09:51 AM    MCV 88.5 12/26/2023 09:51 AM    MCH 28.6 12/26/2023 09:51 AM    MCHC 32.3 12/26/2023 09:51 AM    MPV 10.6 12/26/2023 09:51 AM      No results found for: \"MALBCRT\", \"MICROALBUR\"  Lab Results   Component Value Date/Time    TSHULTRASEN 2.240 05/07/2014 0900     Lab Results   Component Value Date/Time    FREET4 0.83 05/07/2014 0900     No results found for: \"FREET3\"  No results found for: \"THYSTIMIG\"  Immunization History   Administered Date(s) Administered    Influenza Seasonal Injectable - Historical Data 10/14/2010, 10/20/2011    Influenza Vaccine Quad Inj (Pf) 10/19/2018, 09/27/2019, 09/23/2022, 09/19/2023    Influenza Vaccine Quad Inj (Preserved) 09/28/2021    Influenza Vaccine Quad Recombinant 10/26/2020    Influenza, recombinant, trivalent, PF 09/27/2024       Medications:    Current Outpatient Medications:     Wegovy, 0.25 mg, Subcutaneous, Q7 DAYS, Taking    Ozempic (0.25 or 0.5 MG/DOSE), 0.25 mg, Subcutaneous, Q7 DAYS, Taking    estradiol, 1 g, Vaginal, 2X A WEEK    metFORMIN ER, TAKE 1 TABLET BY MOUTH DAILY FOR 14 DAYS, THEN TAKE 2 TABLETS BY MOUTH DAILY FOR 14 DAYS, THEN TAKE 3 TABLETS BY MOUTH DAILY FOR 30 DAYS    levothyroxine, 25 mcg, Oral, AM ES    omeprazole, 40 mg, Oral, DAILY    Fezolinetant, 45 mg, Oral, DAILY    albuterol, 2 Puff, Inhalation, Q4HRS PRN    DULoxetine, 1 Capsule, Oral, QAM    budesonide-formoterol, Inhale 1 Puff. (Patient not taking: Reported on 3/25/2025)    Multiple Vitamins-Minerals (SYSTANE ICAPS AREDS2 PO), 2 Capsule, Oral, QDAY    acetaminophen, 1,000 mg, Oral, QDAY PRN    ibuprofen, 400 mg, Oral, BID PRN    vitamin D3, 2,000 Units, Oral, DAILY      Assessment and Plan:  Prediabetes and weight loss and dyslipidemia:  The patient has a history of endometrial adenocarcinoma, PTEN hamartoma tumor syndrome " (PHTS), thyroid nodules and Hashimoto's thyroiditis.    Currently, there is limited clinical data on the safety of GLP-1 Doc in patients with PHTS. Given the increased cancer risk associated with PHTS, particularly for thyroid and endometrial cancers, caution is advised when considering GLP-1 RA therapy.    Regarding thyroid health, GLP-1 Doc have been associated with an increased risk of thyroid C-cell tumors in rodent studies, leading to contraindications in patients with a personal or family history of medullary thyroid carcinoma (MTC) or multiple endocrine neoplasia syndrome type 2 (MEN2). However, human studies have not consistently demonstrated a significant increase in thyroid cancer risk with GLP-1 RA use.    In terms of endometrial cancer, some preclinical studies suggest that GLP-1 Doc may have anti-proliferative effects on endometrial cancer cells, potentially offering therapeutic benefits. However, clinical evidence is limited, and further research is needed to establish safety and efficacy in this context.    The patient will consult with her oncologists to discuss the potential risks and benefits of GLP-1 RA therapy in the context of her medical history. Their insights will inform the decision-making process regarding the initiation of such therapy.    Alternative weight loss agents were discussed, and one may be considered as an alternative to GLP-1 Doc, pending further evaluation.      Medication(s) recommended after today's visit:   Start Ozempic or Wegovy 0.25 mg every 7 days.  Next follow-up appointment is in [place] as she may need 340B pricing  Consider a statin at subsequent appointment or fibrate pending lipid panel              3.  Lifestyle changes   Focus on eating a DASH/Mediterranean-style diet.   Exercise 30 minutes daily at least 5 days/week, as tolerated.  https://www.niddk.nih.gov/bwp    https://www.nhlbi.nih.gov/education/dash-eating-plan        Follow-up appointment: Return in about 2  weeks (around 6/20/2025).    Rodo Long, PharmD, MS, BCACP, [facility]  Phone [phone] fax [phone]    This note was created using voice recognition software (Dragon). The accuracy of the dictation is limited by the abilities of the software. I have reviewed the note prior to signing, however some errors in grammar and context are still possible. If you have any questions related to this note please do not hesitate to contact our office.     CC:   HARSH Albright, Demetria BRYAN M*  Dr. Josh Martinez

## 2025-06-06 NOTE — Clinical Note
Dr. Poon and Tonio,  This patient is interested in initiating a GLP-1 receptor agonist for the treatment of obesity.  She does have some unique risk factors (see my note for details); however, I was unable to find any specific evidence indicating a clear contraindication to GLP-1 use based on her medical history.  Do either of you have any insight regarding the safety or risks of GLP-1 therapy in the context of her medical conditions?  She also plans to consult with her oncologist for further guidance.   Thanks, Mark

## 2025-06-13 ENCOUNTER — TELEPHONE (OUTPATIENT)
Dept: MEDICAL GROUP | Facility: CLINIC | Age: 50
End: 2025-06-13
Payer: COMMERCIAL

## 2025-06-13 NOTE — TELEPHONE ENCOUNTER
DOCUMENTATION OF PAR STATUS:    1. Name of Medication & Dose: Wegovy     2. Name of Prescription Coverage Company & phone #: [employer]    3. Date Prior Auth Submitted: 6/13/25    4. What information was given to obtain insurance decision? Dx code     5. Prior Auth Status? Approved         6. Patient Notified: N\A

## 2025-06-16 ENCOUNTER — NON-PROVIDER VISIT (OUTPATIENT)
Dept: VASCULAR LAB | Facility: MEDICAL CENTER | Age: 50
End: 2025-06-16
Attending: INTERNAL MEDICINE
Payer: COMMERCIAL

## 2025-06-16 VITALS — BODY MASS INDEX: 40.34 KG/M2 | WEIGHT: 235 LBS

## 2025-06-16 DIAGNOSIS — E66.813 CLASS 3 SEVERE OBESITY DUE TO EXCESS CALORIES WITH SERIOUS COMORBIDITY AND BODY MASS INDEX (BMI) OF 40.0 TO 44.9 IN ADULT: Primary | ICD-10-CM

## 2025-06-16 DIAGNOSIS — R73.03 PREDIABETES: ICD-10-CM

## 2025-06-16 PROCEDURE — 99212 OFFICE O/P EST SF 10 MIN: CPT

## 2025-06-16 RX ORDER — SEMAGLUTIDE 0.68 MG/ML
0.25 INJECTION, SOLUTION SUBCUTANEOUS
Qty: 3 ML | Refills: 1 | Status: SHIPPED | OUTPATIENT
Start: 2025-06-16 | End: 2025-06-26

## 2025-06-16 RX ORDER — SEMAGLUTIDE 0.68 MG/ML
0.25 INJECTION, SOLUTION SUBCUTANEOUS
Qty: 3 ML | Refills: 6 | Status: SHIPPED | OUTPATIENT
Start: 2025-06-16 | End: 2025-06-16

## 2025-06-16 RX ORDER — SEMAGLUTIDE 0.68 MG/ML
0.25 INJECTION, SOLUTION SUBCUTANEOUS
Qty: 3 ML | Refills: 11 | Status: CANCELLED | OUTPATIENT
Start: 2025-06-16

## 2025-06-16 ASSESSMENT — FIBROSIS 4 INDEX: FIB4 SCORE: 0.58

## 2025-06-16 NOTE — PROGRESS NOTES
"Patient Consult Note    Primary care physician: Janet Guerrero P.A.-C.    Reason for consult: Obesity/Weight Management    Date Referral Placed: 5/21/25    HPI:  Carole Garcia is a 49 y.o. old patient who comes in today for evaluation of above stated problem.    Initial Weight: 235 lbs  Initial BMI: 40.3 kg/m2    Most Recent HbA1c: No results found for: \"HBA1C\"     Most Recent TSH:   TSH   Date Value Ref Range Status   05/07/2014 2.240 0.300 - 3.700 uIU/mL Final       Most Recent SrCr and GFR:  Lab Results   Component Value Date/Time    CREATININE 0.76 12/26/2023 09:51 AM      GFR (CKD-EPI)   Date Value Ref Range Status   12/26/2023 98 >60 mL/min/1.73 m 2 Final     Comment:     Estimated Glomerular Filtration Rate is calculated using  race neutral CKD-EPI 2021 equation per NKF-ASN recommendations.         Current Obesity Medication Regimen  None     Previous Obesity Medication(s) and Reason for Discontinuation  None     Lifestyle  Physical Activity:  Current Exercise - difficulty due to development of lymphedema causing significant pain in legs. Very motivated to increase exercise.   Exercise Goal - At least 150 min/week of anything aerobic.    Dietary: Eats 3 meals per day.  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    Endometrial adenocarcinoma (HCC) 10/23/2023    Prediabetes 10/23/2023    Class 2 obesity with body mass index (BMI) of 39.0 to 39.9 in adult 10/23/2023    Macular degeneration, wet (HCC) 10/23/2023    Family history of clotting disorder 10/23/2023    Dyspepsia 10/23/2023    Dyslipidemia " 2014    Iron deficiency anemia 2014    Vitamin D deficiency 2014    Seasonal allergies 2014    Bilateral hand numbness 2014    Tobacco abuse 2013    Obesity 2012    Asthma in adult 02/10/2012       Past Surgical History:  Past Surgical History[1]    Allergies:  Advair diskus    Social History:  Social History     Socioeconomic History    Marital status:      Spouse name: Not on file    Number of children: Not on file    Years of education: Not on file    Highest education level: Not on file   Occupational History    Not on file   Tobacco Use    Smoking status: Former     Current packs/day: 0.00     Average packs/day: 0.6 packs/day for 26.6 years (17.0 ttl pk-yrs)     Types: Cigarettes     Start date: 1998     Quit date: 2017     Years since quittin.4    Smokeless tobacco: Never   Vaping Use    Vaping status: Never Used   Substance and Sexual Activity    Alcohol use: Not Currently     Alcohol/week: 1.2 oz     Types: 1 Glasses of wine, 1 Cans of beer per week     Comment: maybe once/month    Drug use: Not Currently     Types: Oral     Comment: I have taken THC Gummies to help with sleep but not current    Sexual activity: Yes     Partners: Male     Birth control/protection: Surgical   Other Topics Concern    Not on file   Social History Narrative    Not on file     Social Drivers of Health     Financial Resource Strain: Not on file   Food Insecurity: Not on file   Transportation Needs: Not on file   Physical Activity: Not on file   Stress: Not on file   Social Connections: Not on file   Intimate Partner Violence: Not on file   Housing Stability: Not on file       Family History:  Family History   Problem Relation Age of Onset    Diabetes Mother     Heart Disease Mother     Hypertension Mother     Hyperlipidemia Mother     DVT Mother     Cancer Father 57        colon    Genetic Disorder Brother         thyroid    Diabetes Maternal Aunt     Heart Disease  Maternal Aunt     Hypertension Maternal Aunt     Hyperlipidemia Maternal Aunt     Diabetes Maternal Grandmother     Heart Disease Maternal Grandmother     Hypertension Maternal Grandmother     Hyperlipidemia Maternal Grandmother     Alcohol/Drug Maternal Grandfather         Pancreatits       Medications:  Current Medications[2]    Physical Examination:  Vital signs: Wt 107 kg (235 lb)   LMP 12/02/2023   BMI 40.34 kg/m²      Assessment and Plan:    1. Obesity/Weight Management  With recent cancer diagnosis, patient has experienced weight gain. She has also developed lymphedema in her legs which causes significant discomfort with exercise, which she expresses frustration with. She is interested in trying medication in the short-term in order to help form the appropriate habits and rapidly get off the medication. She is highly motivated and has been working on healthy habits already. Her oncologist is aware of and agrees with the initiation of GLP1 therapy.  Additionally, with PTEN mutation, weight loss is willis for tumor prevention. Based on these, initiation of GLP1 therapy is very much appropriate at this time.    - Medication changes:  Start semaglutide at 0.25mg once weekly      - Lifestyle changes:  Diet: Monitor caloric intake - aim for deficit. Maximize lean proteins and veggies. Cut out/down on carbs. Avoid simple sugars.   Referral to Nutrition Services placed: No   Exercise: Increase as tolerated. Goal of at least 150 min/week of anything aerobic.    Follow Up:  4 weeks for titration of ozempic     Holley Scott, NuzhatD    CC:   Janet Guerrero P.A.-C.           [1]   Past Surgical History:  Procedure Laterality Date    GA INTRAOP ID SENTINEL NODE  12/28/2023    Procedure: IDENTIFICATION, LYMPH NODE, SENTINEL, INTRAOPERATIVE, USING DYE INJECTION;  Surgeon: Jesus Rodriguez M.D.;  Location: SURGERY [facility];  Service: Gyn Robotic    HYSTERECTOMY ROBOTIC XI  12/28/2023    Procedure: ROBOTIC HYSTERECTOMY,  BILATERAL SALPINGO-OOPHRECTOMY;  Surgeon: Jesus Rodriguez M.D.;  Location: SURGERY [facility];  Service: Gyn Robotic    NODE BIOPSY SENTINEL Bilateral 12/28/2023    Procedure: BIOPSY, LYMPH NODE, SENTINEL;  Surgeon: Jesus Rodriguez M.D.;  Location: SURGERY [facility];  Service: Gyn Robotic    CHOLECYSTECTOMY  01/01/1999    endometriosis    DILATION AND CURETTAGE      miscarraige    OTHER      Gallbladder, tonsils, carbuncle    TONSILLECTOMY AND ADENOIDECTOMY      as a child   [2]   Current Outpatient Medications:     Semaglutide (WEGOVY) 0.25 MG/0.5ML Solution Auto-injector Pen-injector, Inject 0.5 mL under the skin every 7 days., Disp: 2 mL, Rfl: 6    Semaglutide,0.25 or 0.5MG/DOS, (OZEMPIC, 0.25 OR 0.5 MG/DOSE,) 2 MG/3ML Solution Pen-injector, Inject 0.25 mg under the skin every 7 days., Disp: 3 mL, Rfl: 6    estradiol (ESTRACE) 0.1 MG/GM vaginal cream, Insert 1 g into the vagina two times a week., Disp: 42.5 g, Rfl: 1    metFORMIN ER (GLUCOPHAGE XR) 500 MG TABLET SR 24 HR, TAKE 1 TABLET BY MOUTH DAILY FOR 14 DAYS, THEN TAKE 2 TABLETS BY MOUTH DAILY FOR 14 DAYS, THEN TAKE 3 TABLETS BY MOUTH DAILY FOR 30 DAYS, Disp: 132 Tablet, Rfl: 0    levothyroxine (SYNTHROID) 25 MCG Tab, Take 1 Tablet by mouth every morning on an empty stomach., Disp: 90 Tablet, Rfl: 1    omeprazole (PRILOSEC) 40 MG delayed-release capsule, TAKE 1 CAPSULE BY MOUTH DAILY, Disp: 90 Capsule, Rfl: 2    Fezolinetant 45 MG Tab, Take 45 mg by mouth every day., Disp: , Rfl:     albuterol (PROVENTIL HFA) 108 (90 Base) MCG/ACT Aero Soln inhalation aerosol, Inhale 2 Puffs every four hours as needed for Shortness of Breath., Disp: 54 g, Rfl: 2    DULoxetine (CYMBALTA) 30 MG Cap DR Particles, Take 1 Capsule by mouth every morning., Disp: , Rfl:     budesonide-formoterol (SYMBICORT) 80-4.5 MCG/ACT Aerosol, Inhale 1 Puff. (Patient not taking: Reported on 3/25/2025), Disp: , Rfl:     Multiple Vitamins-Minerals (SYSTANE ICAPS AREDS2 PO), Take 2 Capsules by mouth  every day., Disp: , Rfl:     acetaminophen (TYLENOL) 500 MG Tab, Take 1,000 mg by mouth 1 time a day as needed for Moderate Pain., Disp: , Rfl:     ibuprofen (MOTRIN) 200 MG Tab, Take 400 mg by mouth 2 times a day as needed for Mild Pain., Disp: , Rfl:     vitamin D3 (CHOLECALCIFEROL) 1000 Unit (25 mcg) Tab, Take 2,000 Units by mouth every day., Disp: , Rfl:

## 2025-06-18 ENCOUNTER — TELEPHONE (OUTPATIENT)
Dept: VASCULAR LAB | Facility: MEDICAL CENTER | Age: 50
End: 2025-06-18
Payer: COMMERCIAL

## 2025-06-18 NOTE — TELEPHONE ENCOUNTER
Received New Start request via [employer]  for Semaglutide,0.25 or 0.5MG/DOS, (OZEMPIC, 0.25 OR 0.5 MG/DOSE,) 2 MG/3ML Solution Pen-injector. (Quantity:3 mls, Day Supply:56)     Insurance: ADA  Member ID:  [id number]  BIN: [id number]  PCN: [id number]  Group: [id number]     Ran Test claim via [place] & medication Rejects stating prior authorization is required.    AVELINA Lezama, PhT  Vascular Pharmacy Liaison (Rx Coordinator)  P: [phone]  6/18/2025 9:04 AM  ;

## 2025-06-18 NOTE — TELEPHONE ENCOUNTER
Prior Authorization for Semaglutide,0.25 or 0.5MG/DOS, (OZEMPIC, 0.25 OR 0.5 MG/DOSE,) 2 MG/3ML Solution Pen-injector  (Quantity: 3 mls, Days: 56) has been submitted via Cover My Meds: Key ([id number])    Insurance: AETNA    Will follow up in 24-48 business hours.     AVELINA Lezama, PhT  Vascular Pharmacy Liaison (Rx Coordinator)  P: [phone]  6/18/2025 9:05 AM

## 2025-06-23 NOTE — TELEPHONE ENCOUNTER
New PA submitted for Semaglutide,0.25 or 0.5MG/DOS, (OZEMPIC, 0.25 OR 0.5 MG/DOSE,) 2 MG/3ML Solution Pen-injector  has been denied for a quantity of 3 mls , day supply 56    Prior authorization was denied per the following: Your plan only covers this drug when it is used for certain health conditions. Covered use is for type 2 diabetes. Your plan does not cover the drug for your health condition that your doctor told us you have. We reviewed the information we had. Your request has been denied. Your doctor can send us any new or missing information for us to review. For this drug, you may have to meet other criteria. You can request  the drug policy for more details. You can also request other plan documents for your review.      PA denial reference number: N/A  Insurance: AETNA      Next Steps: routing the steps to provider/clinic staff to obtain the next process.     AVELINA Lezama, PhT  Vascular Pharmacy Liaison (Rx Coordinator)  P: [phone]  6/23/2025 8:52 AM

## 2025-06-23 NOTE — TELEPHONE ENCOUNTER
Received a response from clinic staff to verify if Wegovy PA has ever been started as opposed to proceed with the appeal for Ozempic.   Per encounter from 06/13, PCP's office started the PA already and it was been approved by the plan.     Did a test claim and shows a paid claim for $24.98.     Called [facility] pharmacy @ [phone] and s/w Odin PhT to verify  fill status. Per Odin, the script wast just placed on hold with no additional information. Went ahead and requested to initiate the fill process, but pharmacy  will need to order it, and will notify pt once it is ready for .     Called pt and left a detailed message about the status.     AVELINA Lezama, PhT  Vascular Pharmacy Liaison (Rx Coordinator)  P: [phone]  6/23/2025 11:04 AM

## 2025-06-26 DIAGNOSIS — J45.40 MODERATE PERSISTENT ASTHMA IN ADULT WITHOUT COMPLICATION: ICD-10-CM

## 2025-06-26 RX ORDER — ALBUTEROL SULFATE 90 UG/1
2 INHALANT RESPIRATORY (INHALATION) EVERY 4 HOURS PRN
Qty: 54 G | Refills: 2 | Status: SHIPPED | OUTPATIENT
Start: 2025-06-26

## 2025-06-26 NOTE — TELEPHONE ENCOUNTER
Requested Prescriptions     Pending Prescriptions Disp Refills    albuterol (PROVENTIL HFA) 108 (90 Base) MCG/ACT Aero Soln inhalation aerosol 54 g 2     Sig: Inhale 2 Puffs every four hours as needed for Shortness of Breath.     Last office visit: 3/19/25  Last lab: 3/11/25

## 2025-07-13 NOTE — PROGRESS NOTES
"    Primary Care Provider Janet Guerrero P.A.-C.   Referring Provider: Janet VEE / Briseida Wallis PT  Surgical Oncologist: Dr Jesus Rodriguez   Medical Oncologist: Raghav Downing MD    Radiation Oncologist: Manan Jackson MD        Subjective:   49 y.o.   Patient following up today in the [facility] for ongoing lifestyle survivorship care.    Recent cancer history:   endometrial cancer (PTEN hamartoma tumor syndrome on genetics) Stage 1B   Robotic hysterectomy BSO - Dr Rodriguez 12/2023  VBT radiation - Dr Addison - Brachy therapy   Genetics - PTEN   Raghav Downing - Med Onc  Colonoscopy 2/2024 / [facility] 11/2024  Stage 2 Lymphedema - Briseida Wallis      Co-morbidities: BMI 40, hypothyroid, prediabetes, metabolic syndrome, former smoker - stopped 2016       Health Promotion Strategies:   Sexual health / vaginal estrogen   Weight loss    Pharmacotherapy clinic - DASH / mediterranean diet  Exercise     Interval history  7/15/2025: This very delightful patient presents today for follow-up.  She was seen in the pharmacotherapy clinic and weight loss options were discussed ultimately she was started on Wegovy.  She is on week 3.  She has lost 5 pounds so far.  She is tolerating it very well.  She also has been very intentional about her nutrition and realized that what was happening before was she was \"grazing a lot\" she then was not recognizing how much she was eating and would have very small meals and never really feel hungry and never really feel full.  She now has a breakfast, lunch, dinner schedule.  Her typical meals are overnight oats with almond milk for breakfast, solids including beans and broccoli for lunch, and afternoon protein shake, dinner varies she really tries to have a true dinner but sometimes it is just cottage cheese and tomatoes.    She is hoping that with some weight loss that exercise becomes more comfortable.  She is continuing to stretch with the exercises given by her physical therapist, she " "also purchased a vibration plate and has [facility].  She can do 1 lap.    She states the Vagifem is definitely helping.  Penetrative intercourse is getting easier.  They did not buy vaginal dilators but did buy a different toys which seems to serve the same role.  She feels hopeful in these regards.    Carole Garcia has a current medication list which includes the following prescription(s): wegovy, albuterol, estradiol, levothyroxine, omeprazole, fezolinetant, multiple vitamins-minerals, vitamin d3, metformin er, duloxetine, budesonide-formoterol, acetaminophen, and ibuprofen.     Objective:      Ambulatory Vitals  Encounter Vitals  Blood Pressure: (!) 194/122 (Second readin/96)  Pulse: (!) 122  Respiration: 20  Pulse Oximetry: 95 %  Weight: 105 kg (230 lb 9.6 oz)  Height: 162.6 cm (5' 4\")  BMI (Calculated): 39.58  Pain Score: 8=Moderate-Severe Pain      Physical Exam  Vitals reviewed.   Constitutional:       Appearance: Normal appearance.   Skin:     General: Skin is warm.   Neurological:      General: No focal deficit present.      Mental Status: She is alert and oriented to person, place, and time.   Psychiatric:         Mood and Affect: Mood normal.         Behavior: Behavior normal.         Thought Content: Thought content normal.          No results found for: \"HBA1C\", \"AVGLUC\"     Assessment:  1. Metabolic syndrome    2. Prediabetes    3. Endometrial cancer (HCC)       Plan:  This patient is aware that the goal of the oncology wellness clinic is to assist in optimizing health, sense of well-being, reducing recurrence risk, and improving quality of life after a cancer diagnosis. For those that are high risk for a cancer diagnosis our goal is focused on preventative lifestyle strategies.   Our current plan includes:     We continued to discuss the benefits of weight loss.  Discussed the cancer risk associated with poor metabolic health including metabolic syndrome and prediabetes.  As " well as her prior history of endometrial carcinoma and the benefits of weight loss to reduce risk of recurrence.  She is going to continue her very whole food more Mediterranean approach to eating.  We discussed the importance of getting in enough protein that she has a goal of 100 g/day although struggles to get that high.  We discussed the importance of her eventually getting in and more exercise as well although I think this will become easier with weight loss.    She will continue to twice a week local vaginal estrogen.    Will plan to the each other again in 2 months    Total time spent today, including personal review of past history, face to face time, chart documentation, and  was 35  minutes.

## 2025-07-14 ENCOUNTER — OFFICE VISIT (OUTPATIENT)
Age: 50
End: 2025-07-14
Payer: COMMERCIAL

## 2025-07-14 ENCOUNTER — OFFICE VISIT (OUTPATIENT)
Dept: VASCULAR LAB | Facility: MEDICAL CENTER | Age: 50
End: 2025-07-14
Attending: INTERNAL MEDICINE
Payer: COMMERCIAL

## 2025-07-14 ENCOUNTER — TELEPHONE (OUTPATIENT)
Dept: VASCULAR LAB | Facility: MEDICAL CENTER | Age: 50
End: 2025-07-14

## 2025-07-14 VITALS
BODY MASS INDEX: 39.37 KG/M2 | RESPIRATION RATE: 20 BRPM | WEIGHT: 230.6 LBS | HEIGHT: 64 IN | DIASTOLIC BLOOD PRESSURE: 122 MMHG | SYSTOLIC BLOOD PRESSURE: 194 MMHG | HEART RATE: 122 BPM | OXYGEN SATURATION: 95 %

## 2025-07-14 VITALS — WEIGHT: 230 LBS | HEIGHT: 64 IN | BODY MASS INDEX: 39.27 KG/M2

## 2025-07-14 DIAGNOSIS — E88.810 METABOLIC SYNDROME: Primary | ICD-10-CM

## 2025-07-14 DIAGNOSIS — E78.5 DYSLIPIDEMIA: ICD-10-CM

## 2025-07-14 DIAGNOSIS — E66.813 CLASS 3 SEVERE OBESITY DUE TO EXCESS CALORIES WITH SERIOUS COMORBIDITY AND BODY MASS INDEX (BMI) OF 40.0 TO 44.9 IN ADULT: ICD-10-CM

## 2025-07-14 DIAGNOSIS — R73.03 PREDIABETES: ICD-10-CM

## 2025-07-14 DIAGNOSIS — C54.1 ENDOMETRIAL CANCER (HCC): ICD-10-CM

## 2025-07-14 DIAGNOSIS — R73.03 PREDIABETES: Primary | ICD-10-CM

## 2025-07-14 LAB
25(OH)D3+25(OH)D2 SERPL-MCNC: 31.6 NG/ML (ref 30–100)
ALBUMIN SERPL-MCNC: 4.3 G/DL (ref 3.9–4.9)
ALP SERPL-CCNC: 104 IU/L (ref 44–121)
ALT SERPL-CCNC: 25 IU/L (ref 0–32)
APO B SERPL-MCNC: 83 MG/DL
AST SERPL-CCNC: 24 IU/L (ref 0–40)
BILIRUB SERPL-MCNC: 0.4 MG/DL (ref 0–1.2)
BUN SERPL-MCNC: 8 MG/DL (ref 6–24)
BUN/CREAT SERPL: 10 (ref 9–23)
CALCIUM SERPL-MCNC: 9 MG/DL (ref 8.7–10.2)
CHLORIDE SERPL-SCNC: 103 MMOL/L (ref 96–106)
CHOLEST SERPL-MCNC: 271 MG/DL (ref 100–199)
CO2 SERPL-SCNC: 22 MMOL/L (ref 20–29)
CREAT SERPL-MCNC: 0.81 MG/DL (ref 0.57–1)
EGFRCR SERPLBLD CKD-EPI 2021: 90 ML/MIN/1.73
GLOBULIN SER CALC-MCNC: 2.6 G/DL (ref 1.5–4.5)
GLUCOSE SERPL-MCNC: 90 MG/DL (ref 70–99)
HBA1C MFR BLD: 5.6 % (ref 4.8–5.6)
HDLC SERPL-MCNC: 35 MG/DL
LDL CALC COMMENT:: ABNORMAL
LDLC SERPL CALC-MCNC: 148 MG/DL (ref 0–99)
LPA SERPL-SCNC: 13.3 NMOL/L
POTASSIUM SERPL-SCNC: 4.2 MMOL/L (ref 3.5–5.2)
PROT SERPL-MCNC: 6.9 G/DL (ref 6–8.5)
SODIUM SERPL-SCNC: 140 MMOL/L (ref 134–144)
TRIGL SERPL-MCNC: 464 MG/DL (ref 0–149)
TSH SERPL DL<=0.005 MIU/L-ACNC: 2.76 UIU/ML (ref 0.45–4.5)
VLDLC SERPL CALC-MCNC: 88 MG/DL (ref 5–40)

## 2025-07-14 PROCEDURE — 99212 OFFICE O/P EST SF 10 MIN: CPT

## 2025-07-14 PROCEDURE — 99214 OFFICE O/P EST MOD 30 MIN: CPT | Performed by: OBSTETRICS & GYNECOLOGY

## 2025-07-14 RX ORDER — SEMAGLUTIDE 0.5 MG/.5ML
0.5 INJECTION, SOLUTION SUBCUTANEOUS
Qty: 2 ML | Refills: 2 | Status: SHIPPED | OUTPATIENT
Start: 2025-07-14

## 2025-07-14 ASSESSMENT — FIBROSIS 4 INDEX
FIB4 SCORE: 0.82
FIB4 SCORE: 0.82

## 2025-07-14 ASSESSMENT — PAIN SCALES - GENERAL: PAINLEVEL_OUTOF10: 8=MODERATE-SEVERE PAIN

## 2025-07-14 NOTE — PROGRESS NOTES
Patient Consult Note    Primary care physician: Janet Guerrero P.A.-C.    Reason for consult: Obesity/Weight Management    Date Referral Placed: 5/21/25    HPI:  Carole Garcia is a 49 y.o. old patient who comes in today for evaluation of above stated problem.    Initial Weight: 235 lbs  Initial BMI: 40.3 kg/m2    Weight today: 230 lbs  BMI today: 39.5 kg/m2    Most Recent HbA1c:   Lab Results   Component Value Date/Time    HBA1C 5.6 07/09/2025 04:53 AM        Most Recent TSH:   TSH   Date Value Ref Range Status   05/07/2014 2.240 0.300 - 3.700 uIU/mL Final       Most Recent SrCr and GFR:  Lab Results   Component Value Date/Time    CREATININE 0.81 07/09/2025 04:53 AM    CREATININE 0.76 12/26/2023 09:51 AM      GFR (CKD-EPI)   Date Value Ref Range Status   12/26/2023 98 >60 mL/min/1.73 m 2 Final     Comment:     Estimated Glomerular Filtration Rate is calculated using  race neutral CKD-EPI 2021 equation per NKF-ASN recommendations.         Current Obesity Medication Regimen  Wegovy 0.25 mg SQ weekly    Previous Obesity Medication(s) and Reason for Discontinuation  None     Lifestyle  Physical Activity:  Current Exercise - difficulty due to development of lymphedema causing significant pain in legs. Very motivated to increase exercise. Pt has been going to PT utilizing vibration plate as well as some elastic band stretches.   Exercise Goal - At least 150 min/week of anything aerobic.    Dietary: Eats 3 meals per day. Reports reduced appetite, mainly reduced snacking and has been utilizing scheduled meals instead of snacking between meals. Historically has been a grazer.   Breakfast - overnight oats with almond milk   Lunch - broccoli salad, mediterranean salads   Dinner - salads, bowl of cereal    Snack - fruits   Beverage - coffee, water      Past Medical History:  Patient Active Problem List    Diagnosis Date Noted    Hypothyroidism due to Hashimoto thyroiditis 01/21/2025    Lung nodule 01/21/2025    Thyroid nodule  10/30/2024    Localized edema 10/30/2024    PTEN hamartoma tumor syndrome (PHTS) (Formerly [facility]) 2024    Endometrial adenocarcinoma ([facility]) 10/23/2023    Prediabetes 10/23/2023    Class 2 obesity with body mass index (BMI) of 39.0 to 39.9 in adult 10/23/2023    Macular degeneration, wet ([facility]) 10/23/2023    Family history of clotting disorder 10/23/2023    Dyspepsia 10/23/2023    Dyslipidemia 2014    Iron deficiency anemia 2014    Vitamin D deficiency 2014    Seasonal allergies 2014    Bilateral hand numbness 2014    Tobacco abuse 2013    Obesity 2012    Asthma in adult 02/10/2012       Past Surgical History:  Past Surgical History[1]    Allergies:  Advair diskus    Social History:  Social History     Socioeconomic History    Marital status:      Spouse name: Not on file    Number of children: Not on file    Years of education: Not on file    Highest education level: Not on file   Occupational History    Not on file   Tobacco Use    Smoking status: Former     Current packs/day: 0.00     Average packs/day: 0.6 packs/day for 26.6 years (17.0 ttl pk-yrs)     Types: Cigarettes     Start date: 1998     Quit date: 2017     Years since quittin.5    Smokeless tobacco: Never   Vaping Use    Vaping status: Never Used   Substance and Sexual Activity    Alcohol use: Not Currently     Alcohol/week: 1.2 oz     Types: 1 Glasses of wine, 1 Cans of beer per week     Comment: maybe once/month    Drug use: Not Currently     Types: Oral     Comment: I have taken THC Gummies to help with sleep but not current    Sexual activity: Yes     Partners: Male     Birth control/protection: Surgical   Other Topics Concern    Not on file   Social History Narrative    Not on file     Social Drivers of Health     Financial Resource Strain: Not on file   Food Insecurity: Not on file   Transportation Needs: Not on file   Physical Activity: Not on file   Stress: Not on file   Social Connections:  "Not on file   Intimate Partner Violence: Not on file   Housing Stability: Not on file       Family History:  Family History   Problem Relation Age of Onset    Diabetes Mother     Heart Disease Mother     Hypertension Mother     Hyperlipidemia Mother     DVT Mother     Cancer Father 57        colon    Genetic Disorder Brother         thyroid    Diabetes Maternal Aunt     Heart Disease Maternal Aunt     Hypertension Maternal Aunt     Hyperlipidemia Maternal Aunt     Diabetes Maternal Grandmother     Heart Disease Maternal Grandmother     Hypertension Maternal Grandmother     Hyperlipidemia Maternal Grandmother     Alcohol/Drug Maternal Grandfather         Pancreatits       Medications:  Current Medications[2]    Physical Examination:  Vital signs: Ht 1.626 m (5' 4\")   Wt 104 kg (230 lb)   LMP 12/02/2023   BMI 39.48 kg/m²      Assessment and Plan:    1. Obesity/Weight Management  Patient's oncologist is aware of and agrees with the initiation of GLP1 therapy.  Additionally, with PTEN mutation, weight loss is willis for tumor prevention. Based on these, continuation of GLP1 therapy is appropriate at this time.  Pt reports that she was able to initiate Wegovy 0.25 mg SQ weekly and has injected 3 doses prior to today's visit.  She reports that she has tolerated it well without any issues regarding side effects aside from occasional hiccups when she first wakes up which is non-concerning to her at this time.  Pt has lost ~4-5 lbs since initiating Wegovy which she is pleased with and she has made changes to her diet as she used to graze throughout the day, but has now scheduled dedicated times for meals.  She is also attending PT to help increase flexibility, stability, and exercise.     - Medication changes:  START semaglutide (Wegovy) at 0.5mg once weekly      - Lifestyle changes:  Diet: Monitor caloric intake - aim for deficit. Maximize lean proteins and veggies. Cut out/down on carbs. Avoid simple sugars.   Referral to " Nutrition Services placed: No   Exercise: Increase as tolerated. Goal of at least 150 min/week of anything aerobic.    Follow Up:  5 weeks for titration of Wegovy     Walter Hernandez, PharmD    CC:   Janet Guerrero P.A.-C.  Rx Coordinators         [1]   Past Surgical History:  Procedure Laterality Date    VA INTRAOP ID SENTINEL NODE  12/28/2023    Procedure: IDENTIFICATION, LYMPH NODE, SENTINEL, INTRAOPERATIVE, USING DYE INJECTION;  Surgeon: Jesus Rodriguez M.D.;  Location: SURGERY [facility];  Service: Gyn Robotic    HYSTERECTOMY ROBOTIC XI  12/28/2023    Procedure: ROBOTIC HYSTERECTOMY, BILATERAL SALPINGO-OOPHRECTOMY;  Surgeon: Jesus Rodriguez M.D.;  Location: SURGERY [facility];  Service: Gyn Robotic    NODE BIOPSY SENTINEL Bilateral 12/28/2023    Procedure: BIOPSY, LYMPH NODE, SENTINEL;  Surgeon: Jesus Rodriguez M.D.;  Location: SURGERY [facility];  Service: Gyn Robotic    CHOLECYSTECTOMY  01/01/1999    endometriosis    DILATION AND CURETTAGE      miscarraige    OTHER      Gallbladder, tonsils, carbuncle    TONSILLECTOMY AND ADENOIDECTOMY      as a child   [2]   Current Outpatient Medications:     Semaglutide (WEGOVY) 0.5 MG/0.5ML Solution Auto-injector Pen-injector, Inject 0.5 mL under the skin every 7 days., Disp: 2 mL, Rfl: 2    albuterol (PROVENTIL HFA) 108 (90 Base) MCG/ACT Aero Soln inhalation aerosol, Inhale 2 Puffs every four hours as needed for Shortness of Breath., Disp: 54 g, Rfl: 2    estradiol (ESTRACE) 0.1 MG/GM vaginal cream, Insert 1 g into the vagina two times a week., Disp: 42.5 g, Rfl: 1    metFORMIN ER (GLUCOPHAGE XR) 500 MG TABLET SR 24 HR, TAKE 1 TABLET BY MOUTH DAILY FOR 14 DAYS, THEN TAKE 2 TABLETS BY MOUTH DAILY FOR 14 DAYS, THEN TAKE 3 TABLETS BY MOUTH DAILY FOR 30 DAYS, Disp: 132 Tablet, Rfl: 0    levothyroxine (SYNTHROID) 25 MCG Tab, Take 1 Tablet by mouth every morning on an empty stomach., Disp: 90 Tablet, Rfl: 1    omeprazole (PRILOSEC) 40 MG delayed-release capsule, TAKE 1 CAPSULE BY  MOUTH DAILY, Disp: 90 Capsule, Rfl: 2    Fezolinetant 45 MG Tab, Take 45 mg by mouth every day., Disp: , Rfl:     DULoxetine (CYMBALTA) 30 MG Cap DR Particles, Take 1 Capsule by mouth every morning., Disp: , Rfl:     budesonide-formoterol (SYMBICORT) 80-4.5 MCG/ACT Aerosol, Inhale 1 Puff. (Patient not taking: Reported on 3/25/2025), Disp: , Rfl:     Multiple Vitamins-Minerals (SYSTANE ICAPS AREDS2 PO), Take 2 Capsules by mouth every day., Disp: , Rfl:     acetaminophen (TYLENOL) 500 MG Tab, Take 1,000 mg by mouth 1 time a day as needed for Moderate Pain., Disp: , Rfl:     ibuprofen (MOTRIN) 200 MG Tab, Take 400 mg by mouth 2 times a day as needed for Mild Pain., Disp: , Rfl:     vitamin D3 (CHOLECALCIFEROL) 1000 Unit (25 mcg) Tab, Take 2,000 Units by mouth every day., Disp: , Rfl:

## 2025-07-14 NOTE — Clinical Note
Hi there, can we work in releasing this patient's Wegovy prescription. Looks like it was covered when her PCP was prescribing. Let me know if you need anything else from me. Thank yoU!!  Walter

## 2025-07-14 NOTE — TELEPHONE ENCOUNTER
Received New Start request via [employer]  for   Semaglutide (WEGOVY) 0.5 MG/0.5ML Solution Auto-injector Pen-injector. (Quantity:2 mls, Day Supply:28)     Insurance: RICH   Member ID:  [id number]  BIN: [id number]  PCN: [id number]  Group: [id number]     Ran Test claim via [place] & medication pays for $24.98 copay. Pt had declined to use [facility] mail order and RXC services. Will release Rx to pharmacy on file: [facility] #[id number] [place] [address]      Seema Ballesteros, AVELINA, PhT  Vascular Pharmacy Liaison (Rx Coordinator)  P: [phone]  7/14/2025 12:07 PM

## 2025-07-24 ENCOUNTER — TELEPHONE (OUTPATIENT)
Dept: OCCUPATIONAL THERAPY | Facility: REHABILITATION | Age: 50
End: 2025-07-24
Payer: COMMERCIAL

## 2025-07-24 NOTE — OP THERAPY DISCHARGE SUMMARY
Outpatient Occupational Therapy  DISCHARGE SUMMARY NOTE    [facility] Occupational Therapy [facility]  [address]  [address]  [place] [address]  Phone:  [phone]  Fax:  [phone]    Date of Visit: 07/24/2025    Patient: Carole Garcia  YOB: 1975  MRN: [id number]     Referring Provider: Demetria Alfred M.D.  [address]  [address]  [place] [address]   Referring Diagnosis Endometrial adenocarcinoma (HCC) [C54.1];Dyspareunia due to medical condition in female [N94.19]       Comments:  Pt completed eval on 4/4/25 and cancelled her follow up appointments. Appropriate to discharge.  Please re-order if pt wants to return.    Mayra Greenwood, OT    Date: 7/24/2025

## 2025-07-25 DIAGNOSIS — E06.3 HYPOTHYROIDISM DUE TO HASHIMOTO THYROIDITIS: ICD-10-CM

## 2025-07-25 NOTE — TELEPHONE ENCOUNTER
Requested Prescriptions     Pending Prescriptions Disp Refills    levothyroxine (SYNTHROID) 25 MCG Tab [Pharmacy Med Name: LEVOTHYROXINE 25 MCG TABLET] 90 Tablet 1     Sig: TAKE ONE TABLET BY MOUTH EVERY MORNING ON AN EMPTY STOMACH     Last office visit: 3/19/25  Last lab: 7/8/25

## 2025-07-28 RX ORDER — LEVOTHYROXINE SODIUM 25 UG/1
25 TABLET ORAL
Qty: 90 TABLET | Refills: 3 | Status: SHIPPED | OUTPATIENT
Start: 2025-07-28

## 2025-08-14 DIAGNOSIS — E66.09 CLASS 2 OBESITY DUE TO EXCESS CALORIES WITHOUT SERIOUS COMORBIDITY WITH BODY MASS INDEX (BMI) OF 39.0 TO 39.9 IN ADULT: ICD-10-CM

## 2025-08-14 DIAGNOSIS — E66.812 CLASS 2 OBESITY DUE TO EXCESS CALORIES WITHOUT SERIOUS COMORBIDITY WITH BODY MASS INDEX (BMI) OF 39.0 TO 39.9 IN ADULT: ICD-10-CM

## 2025-08-14 RX ORDER — METFORMIN HYDROCHLORIDE 500 MG/1
1500 TABLET, EXTENDED RELEASE ORAL DAILY
Qty: 270 TABLET | Refills: 0 | Status: SHIPPED | OUTPATIENT
Start: 2025-08-14 | End: 2025-11-12

## 2025-08-20 ENCOUNTER — OFFICE VISIT (OUTPATIENT)
Dept: VASCULAR LAB | Facility: MEDICAL CENTER | Age: 50
End: 2025-08-20
Attending: INTERNAL MEDICINE
Payer: COMMERCIAL

## 2025-08-20 ENCOUNTER — SPECIALTY PHARMACY (OUTPATIENT)
Dept: VASCULAR LAB | Facility: MEDICAL CENTER | Age: 50
End: 2025-08-20

## 2025-08-20 VITALS — WEIGHT: 224.6 LBS | BODY MASS INDEX: 38.55 KG/M2

## 2025-08-20 DIAGNOSIS — E66.813 CLASS 3 SEVERE OBESITY DUE TO EXCESS CALORIES WITH SERIOUS COMORBIDITY AND BODY MASS INDEX (BMI) OF 40.0 TO 44.9 IN ADULT: Primary | ICD-10-CM

## 2025-08-20 PROCEDURE — 99213 OFFICE O/P EST LOW 20 MIN: CPT

## 2025-08-20 ASSESSMENT — FIBROSIS 4 INDEX: FIB4 SCORE: 0.84

## 2025-08-21 PROCEDURE — RXMED WILLOW AMBULATORY MEDICATION CHARGE: Performed by: NURSE PRACTITIONER

## 2025-08-25 ENCOUNTER — PHARMACY VISIT (OUTPATIENT)
Dept: PHARMACY | Facility: MEDICAL CENTER | Age: 50
End: 2025-08-25
Payer: COMMERCIAL

## (undated) DEVICE — SPATULA PERMANENT CAUTERY DA VINCI 10X'S REUSABLE

## (undated) DEVICE — TUBE E-T HI-LO CUFF 7.0MM (10EA/PK)

## (undated) DEVICE — GLOVE BIOGEL PI INDICATOR SZ 6.5 SURGICAL PF LF - (50/BX 4BX/CA)

## (undated) DEVICE — GLOVE BIOGEL PI ORTHO SZ 7.5 PF LF (40PR/BX)

## (undated) DEVICE — GLOVE SZ 6.5 BIOGEL PI MICRO - PF LF (50PR/BX)

## (undated) DEVICE — DRESSING TRANSPARENT FILM TEGADERM 2.375 X 2.75"  (100EA/BX)"

## (undated) DEVICE — SET LEADWIRE 5 LEAD BEDSIDE DISPOSABLE ECG (1SET OF 5/EA)

## (undated) DEVICE — DRAPE COLUMN  BOX OF 20

## (undated) DEVICE — SPECIMEN BAG ENDOCATCH II15MM 15MM SPECIMEN RETRIEVAL (3EA/CA)

## (undated) DEVICE — SYRINGE ASEPTO - (50EA/CA

## (undated) DEVICE — SLEEVE, VASO, THIGH, MED

## (undated) DEVICE — SUTURE QUILL 0 PDO 14X14 - (12/BX)

## (undated) DEVICE — DRESSING NON-ADHERING 8 X 3 - (50/BX)

## (undated) DEVICE — SUTURE GENERAL

## (undated) DEVICE — SUTURE 3-0 MONOCRYL PLUS PS-1 - 27 INCH (36/BX)

## (undated) DEVICE — CANISTER SUCTION 3000ML MECHANICAL FILTER AUTO SHUTOFF MEDI-VAC NONSTERILE LF DISP  (40EA/CA)

## (undated) DEVICE — TRAY CATHETER FOLEY URINE METER W/STATLOCK 350ML (10EA/CA)

## (undated) DEVICE — SEAL 5MM-8MM UNIVERSAL  BOX OF 10

## (undated) DEVICE — COVER LIGHT HANDLE ALC PLUS DISP (18EA/BX)

## (undated) DEVICE — SLEEVE VASO CALF MED - (10PR/CA)

## (undated) DEVICE — ROBOTIC SURGERY SERVICES

## (undated) DEVICE — COVER FOOT UNIVERSAL DISP. - (25EA/CA)

## (undated) DEVICE — SET SUCTION/IRRIGATION WITH DISPOSABLE TIP (6/CA )PART #0250-070-520 IS A SUB

## (undated) DEVICE — BIPOLAR FORCE DA VINCI 12X'S REISABLE

## (undated) DEVICE — DRAPE ARM  BOX OF 20

## (undated) DEVICE — GLOVE BIOGEL PI INDICATOR SZ 7.0 SURGICAL PF LF - (50/BX 4BX/CA)

## (undated) DEVICE — SET EXTENSION WITH 2 PORTS (48EA/CA) ***PART #2C8610 IS A SUBSTITUTE*****

## (undated) DEVICE — ARMREST CRADLE FOAM - (2PR/PK 12PR/CA)

## (undated) DEVICE — FORCEPS PROGRASP (18UN/EA)

## (undated) DEVICE — GLOVE COTTON STERILE (50/CA)

## (undated) DEVICE — ELECTRODE DUAL RETURN W/ CORD - (50/PK)

## (undated) DEVICE — TRAY SRGPRP PVP IOD WT PRP - (20/CA)

## (undated) DEVICE — NEEDLE INSUFFLATION FOR STEP - (12/BX)

## (undated) DEVICE — PAD OR TABLE DA VINCI 2IN X 20IN X 72IN - (12EA/CA)

## (undated) DEVICE — TUBING CLEARLINK DUO-VENT - C-FLO (48EA/CA)

## (undated) DEVICE — GOWN WARMING STANDARD FLEX - (30/CA)

## (undated) DEVICE — CHLORAPREP 26 ML APPLICATOR - ORANGE TINT(25/CA)

## (undated) DEVICE — NEEDLE DRIVER MEGA SUTURECUT DA VINCI 15X'S REUSABLE

## (undated) DEVICE — GOWN SURGEONS X-LARGE - DISP. (30/CA)

## (undated) DEVICE — LACTATED RINGERS INJ 1000 ML - (14EA/CA 60CA/PF)

## (undated) DEVICE — PACK TRENGUARD 450 PROCEDURE (12EA/CA)

## (undated) DEVICE — SUCTION INSTRUMENT YANKAUER BULBOUS TIP W/O VENT (50EA/CA)

## (undated) DEVICE — SENSOR OXIMETER ADULT SPO2 RD SET (20EA/BX)

## (undated) DEVICE — PACK GYN DAVINCI (1EA/CA)

## (undated) DEVICE — TOWEL STOP TIMEOUT SAFETY FLAG (40EA/CA)